# Patient Record
Sex: MALE | Race: WHITE | NOT HISPANIC OR LATINO | Employment: OTHER | URBAN - METROPOLITAN AREA
[De-identification: names, ages, dates, MRNs, and addresses within clinical notes are randomized per-mention and may not be internally consistent; named-entity substitution may affect disease eponyms.]

---

## 2017-03-29 ENCOUNTER — TELEPHONE (OUTPATIENT)
Dept: NEUROSURGERY | Facility: CLINIC | Age: 82
End: 2017-03-29

## 2017-03-29 NOTE — TELEPHONE ENCOUNTER
"Pts wife called to schedule an appt for . Informed wife we will need a referral in order to schedule pt. Wife is very concerned stating that "they do not have time to wait for a referral". Reiterated to wife that i understand the concern and would like to assist in any way possible however a referral is needed. Informed wife that once referral is sent we can schedule pt to see Dr. Johnston but due to the high intake of pts Dr. Johnston would not have an open availability until 4/26/17. Wife states that this is to long of a wait and that this is an "emergency and he will die before then". Advised wife that if she feels this is a medical emergency to take pt to local ER. Wife states the ER has done nothing to help but she will call his PCP and discuss being admitted to hospital for pain control.   "

## 2017-04-07 ENCOUNTER — TELEPHONE (OUTPATIENT)
Dept: RHEUMATOLOGY | Facility: CLINIC | Age: 82
End: 2017-04-07

## 2017-04-07 NOTE — TELEPHONE ENCOUNTER
----- Message from Yane Nicholas sent at 4/5/2017  9:52 AM CDT -----  Contact: wife  States patient is in extreme pain and needs to be seen sooner than May.  Please call back at           [phone]

## 2017-04-13 ENCOUNTER — HISTORICAL (OUTPATIENT)
Dept: ADMINISTRATIVE | Facility: HOSPITAL | Age: 82
End: 2017-04-13

## 2017-04-13 LAB
ALBUMIN SERPL-MCNC: 3.8 G/DL (ref 3.1–4.7)
ALP SERPL-CCNC: 72 IU/L (ref 40–104)
ALT (SGPT): 26 IU/L (ref 3–33)
AST SERPL-CCNC: 23 IU/L (ref 10–40)
BASOPHILS NFR BLD: 0.1 K/UL (ref 0–0.2)
BASOPHILS NFR BLD: 0.5 %
BILIRUB SERPL-MCNC: 0.9 MG/DL (ref 0.3–1)
BUN SERPL-MCNC: 31 MG/DL (ref 8–20)
CALCIUM SERPL-MCNC: 10.1 MG/DL (ref 7.7–10.4)
CHLORIDE: 105 MMOL/L (ref 98–110)
CK SERPL-CCNC: 82 IU/L (ref 18–170)
CO2 SERPL-SCNC: 25.1 MMOL/L (ref 22.8–31.6)
CREATININE: 1.22 MG/DL (ref 0.6–1.4)
CRP SERPL-MCNC: 0.44 MG/DL (ref 0–1.4)
EOSINOPHIL NFR BLD: 0.1 K/UL (ref 0–0.7)
EOSINOPHIL NFR BLD: 0.7 %
ERYTHROCYTE [DISTWIDTH] IN BLOOD BY AUTOMATED COUNT: 13.7 % (ref 11.7–14.9)
GLUCOSE: 105 MG/DL (ref 70–99)
GRAN #: 11.5 K/UL (ref 1.4–6.5)
GRAN%: 77.8 %
HCT VFR BLD AUTO: 36.5 % (ref 39–55)
HGB BLD-MCNC: 11.8 G/DL (ref 14–16)
IMMATURE GRANS (ABS): 0.3 K/UL (ref 0–1)
IMMATURE GRANULOCYTES: 1.8 %
LYMPH #: 1.6 K/UL (ref 1.2–3.4)
LYMPH%: 11.1 %
MCH RBC QN AUTO: 30.9 PG (ref 25–35)
MCHC RBC AUTO-ENTMCNC: 32.3 G/DL (ref 31–36)
MCV RBC AUTO: 95.5 FL (ref 80–100)
MONO #: 1.2 K/UL (ref 0.1–0.6)
MONO%: 8.1 %
NUCLEATED RBCS: 0 %
PLATELET # BLD AUTO: 188 K/UL (ref 140–440)
PMV BLD AUTO: 10.9 FL (ref 8.8–12.7)
POTASSIUM SERPL-SCNC: 4.8 MMOL/L (ref 3.5–5)
PROT SERPL-MCNC: 6.8 G/DL (ref 6–8.2)
RBC # BLD AUTO: 3.82 M/UL (ref 4.3–5.9)
SODIUM: 140 MMOL/L (ref 134–144)
T4 FREE SP9 P CHAL SERPL-SCNC: 1.9 NG/DL (ref 0.45–1.27)
TSH SERPL DL<=0.005 MIU/L-ACNC: 0.02 ULU/ML (ref 0.3–5.6)
URATE SERPL-MCNC: 3.8 MG/DL (ref 2.6–7.8)
WBC # BLD AUTO: 14.8 K/UL (ref 5–10)

## 2017-04-14 LAB
ALBUMIN SERPL-MCNC: 3.4 G/DL (ref 2.9–4.4)
ALBUMIN/GLOB SERPL ELPH: 1.1 {RATIO} (ref 0.7–1.7)
ALPHA 1 GLOBULIN/PROTEIN TOTAL: 0.3 G/DL (ref 0–0.4)
ALPHA 2 GLOBULIN/PROTEIN TOTAL: 0.8 G/DL (ref 0.4–1)
B-GLOBULIN FLD ELPH-MCNC: 0.9 G/DL (ref 0.7–1.3)
CCP ANTIBODIES IGG/IGA: 7 UNITS (ref 0–19)
GAMMA GLOB FLD ELPH-MCNC: 1 G/DL (ref 0.4–1.8)
GLOBULIN SER CALC-MCNC: 3 G/DL (ref 2.2–3.9)
Lab: ABNORMAL
M PROTEIN MFR UR ELPH: 0.7 G/DL
PROT SERPL-MCNC: 6.4 G/DL (ref 6–8.5)
RHEUMATOID FACT SERPL-ACNC: 8.8 IU/ML (ref 0–13.9)

## 2017-05-27 RX ORDER — CELECOXIB 200 MG/1
CAPSULE ORAL
Qty: 180 CAPSULE | Refills: 1 | Status: SHIPPED | OUTPATIENT
Start: 2017-05-27 | End: 2017-06-14 | Stop reason: SDUPTHER

## 2017-05-30 ENCOUNTER — OFFICE VISIT (OUTPATIENT)
Dept: RHEUMATOLOGY | Facility: CLINIC | Age: 82
End: 2017-05-30
Payer: MEDICARE

## 2017-05-30 VITALS
HEIGHT: 71 IN | SYSTOLIC BLOOD PRESSURE: 120 MMHG | DIASTOLIC BLOOD PRESSURE: 72 MMHG | WEIGHT: 211.81 LBS | BODY MASS INDEX: 29.65 KG/M2

## 2017-05-30 DIAGNOSIS — M25.60 MORNING STIFFNESS OF JOINTS: ICD-10-CM

## 2017-05-30 DIAGNOSIS — M19.90 CHRONIC INFLAMMATORY ARTHRITIS: Primary | Chronic | ICD-10-CM

## 2017-05-30 DIAGNOSIS — M19.90 CHRONIC INFLAMMATORY ARTHRITIS: ICD-10-CM

## 2017-05-30 DIAGNOSIS — Z79.631 METHOTREXATE, LONG TERM, CURRENT USE: ICD-10-CM

## 2017-05-30 LAB
ALBUMIN SERPL-MCNC: 4.1 G/DL (ref 3.1–4.7)
ALP SERPL-CCNC: 70 IU/L (ref 40–104)
ALT (SGPT): 26 IU/L (ref 3–33)
AST SERPL-CCNC: 26 IU/L (ref 10–40)
BASOPHILS NFR BLD: 0.1 K/UL (ref 0–0.2)
BASOPHILS NFR BLD: 0.7 %
BILIRUB SERPL-MCNC: 0.7 MG/DL (ref 0.3–1)
BUN SERPL-MCNC: 34 MG/DL (ref 8–20)
CALCIUM SERPL-MCNC: 10.2 MG/DL (ref 7.7–10.4)
CHLORIDE: 106 MMOL/L (ref 98–110)
CO2 SERPL-SCNC: 25.4 MMOL/L (ref 22.8–31.6)
CREATININE: 1.45 MG/DL (ref 0.6–1.4)
CRP SERPL-MCNC: 0.06 MG/DL (ref 0–1.4)
EOSINOPHIL NFR BLD: 0.1 K/UL (ref 0–0.7)
EOSINOPHIL NFR BLD: 1.4 %
ERYTHROCYTE [DISTWIDTH] IN BLOOD BY AUTOMATED COUNT: 16.9 % (ref 11.7–14.9)
GLUCOSE: 91 MG/DL (ref 70–99)
GRAN #: 5.9 K/UL (ref 1.4–6.5)
GRAN%: 66.5 %
HCT VFR BLD AUTO: 43 % (ref 39–55)
HGB BLD-MCNC: 14.1 G/DL (ref 14–16)
IMMATURE GRANS (ABS): 0.2 K/UL (ref 0–1)
IMMATURE GRANULOCYTES: 2.3 %
LYMPH #: 1.7 K/UL (ref 1.2–3.4)
LYMPH%: 19.6 %
MCH RBC QN AUTO: 32.7 PG (ref 25–35)
MCHC RBC AUTO-ENTMCNC: 32.8 G/DL (ref 31–36)
MCV RBC AUTO: 99.8 FL (ref 80–100)
MONO #: 0.8 K/UL (ref 0.1–0.6)
MONO%: 9.5 %
NUCLEATED RBCS: 0 %
PLATELET # BLD AUTO: 168 K/UL (ref 140–440)
PMV BLD AUTO: 10.9 FL (ref 8.8–12.7)
POTASSIUM SERPL-SCNC: 5.1 MMOL/L (ref 3.5–5)
PROT SERPL-MCNC: 7 G/DL (ref 6–8.2)
RBC # BLD AUTO: 4.31 M/UL (ref 4.3–5.9)
SODIUM: 139 MMOL/L (ref 134–144)
WBC # BLD AUTO: 8.9 K/UL (ref 5–10)

## 2017-05-30 PROCEDURE — 99213 OFFICE O/P EST LOW 20 MIN: CPT | Mod: ,,, | Performed by: INTERNAL MEDICINE

## 2017-05-30 RX ORDER — HYDROXYCHLOROQUINE SULFATE 200 MG/1
200 TABLET, FILM COATED ORAL 2 TIMES DAILY
Qty: 180 TABLET | Refills: 3 | Status: SHIPPED | OUTPATIENT
Start: 2017-05-30 | End: 2017-06-26 | Stop reason: SDUPTHER

## 2017-06-01 ENCOUNTER — TELEPHONE (OUTPATIENT)
Dept: RHEUMATOLOGY | Facility: CLINIC | Age: 82
End: 2017-06-01

## 2017-06-01 DIAGNOSIS — R94.4 ABNORMAL RESULTS OF KIDNEY FUNCTION STUDIES: Primary | ICD-10-CM

## 2017-06-01 NOTE — TELEPHONE ENCOUNTER
Pt notified of results. Pt states he has been taking 2 celebrex qd. Pt instructed to decrease to 1 daily and repeat bmp in one month pt verbalizes understanding of all. Lab order ready. leander

## 2017-06-14 DIAGNOSIS — M19.90 OSTEOARTHRITIS, UNSPECIFIED OSTEOARTHRITIS TYPE, UNSPECIFIED SITE: Primary | ICD-10-CM

## 2017-06-15 RX ORDER — CELECOXIB 200 MG/1
200 CAPSULE ORAL DAILY
Qty: 90 CAPSULE | Refills: 1 | Status: SHIPPED | OUTPATIENT
Start: 2017-06-15 | End: 2017-11-28 | Stop reason: SDUPTHER

## 2017-06-20 DIAGNOSIS — M19.90 CHRONIC INFLAMMATORY ARTHRITIS: Chronic | ICD-10-CM

## 2017-06-20 DIAGNOSIS — M19.90 CHRONIC INFLAMMATORY ARTHRITIS: ICD-10-CM

## 2017-06-20 DIAGNOSIS — M35.3 PMR (POLYMYALGIA RHEUMATICA): Primary | ICD-10-CM

## 2017-06-20 RX ORDER — PREDNISONE 2.5 MG/1
2.5 TABLET ORAL 2 TIMES DAILY
Qty: 180 TABLET | Refills: 1 | OUTPATIENT
Start: 2017-06-20

## 2017-06-20 RX ORDER — METHOTREXATE 2.5 MG/1
TABLET ORAL
Refills: 1 | COMMUNITY
Start: 2017-05-24 | End: 2019-06-20

## 2017-06-20 RX ORDER — FOLIC ACID 1 MG/1
TABLET ORAL
Refills: 1 | OUTPATIENT
Start: 2017-06-20

## 2017-06-20 RX ORDER — PREDNISONE 2.5 MG/1
2.5 TABLET ORAL 2 TIMES DAILY
Refills: 1 | COMMUNITY
Start: 2017-04-29 | End: 2017-06-26 | Stop reason: SDUPTHER

## 2017-06-20 RX ORDER — METHOTREXATE 2.5 MG/1
TABLET ORAL
Refills: 1 | OUTPATIENT
Start: 2017-06-20

## 2017-06-20 RX ORDER — FOLIC ACID 1 MG/1
TABLET ORAL
Refills: 1 | COMMUNITY
Start: 2017-05-24 | End: 2017-06-26 | Stop reason: SDUPTHER

## 2017-06-20 NOTE — TELEPHONE ENCOUNTER
My note states that I had placed him not on methotrexate but on hydroxychloroquine. Please check with the patient to determine which medication he is actually taking from me. Let me know. Thank you

## 2017-06-20 NOTE — TELEPHONE ENCOUNTER
Please look into the note above on whether Mr. Logan Galeano is on Plaquenil or methotrexate. Thanks

## 2017-06-26 RX ORDER — HYDROXYCHLOROQUINE SULFATE 200 MG/1
200 TABLET, FILM COATED ORAL 2 TIMES DAILY
Qty: 180 TABLET | Refills: 3 | Status: SHIPPED | OUTPATIENT
Start: 2017-06-26 | End: 2018-06-26

## 2017-06-26 RX ORDER — PREDNISONE 2.5 MG/1
2.5 TABLET ORAL 2 TIMES DAILY
Qty: 180 TABLET | Refills: 1 | Status: SHIPPED | OUTPATIENT
Start: 2017-06-26 | End: 2017-12-05 | Stop reason: SDUPTHER

## 2017-06-26 RX ORDER — FOLIC ACID 1 MG/1
TABLET ORAL
Qty: 90 TABLET | Refills: 3 | Status: SHIPPED | OUTPATIENT
Start: 2017-06-26 | End: 2018-06-03 | Stop reason: SDUPTHER

## 2017-06-29 LAB
ALBUMIN SERPL-MCNC: 4.6 G/DL (ref 3.1–4.7)
ALP SERPL-CCNC: 60 IU/L (ref 40–104)
ALT (SGPT): 24 IU/L (ref 3–33)
AST SERPL-CCNC: 28 IU/L (ref 10–40)
BILIRUB SERPL-MCNC: 0.8 MG/DL (ref 0.3–1)
BUN SERPL-MCNC: 30 MG/DL (ref 8–20)
CALCIUM SERPL-MCNC: 10.4 MG/DL (ref 7.7–10.4)
CHLORIDE: 105 MMOL/L (ref 98–110)
CO2 SERPL-SCNC: 24.7 MMOL/L (ref 22.8–31.6)
CREATININE: 1.34 MG/DL (ref 0.6–1.4)
GLUCOSE: 119 MG/DL (ref 70–99)
HBA1C MFR BLD: 5.9 % (ref 3.1–6.5)
POTASSIUM SERPL-SCNC: 4.5 MMOL/L (ref 3.5–5)
PROT SERPL-MCNC: 7.1 G/DL (ref 6–8.2)
SODIUM: 137 MMOL/L (ref 134–144)
TSH SERPL DL<=0.005 MIU/L-ACNC: 0.71 ULU/ML (ref 0.3–5.6)
URATE SERPL-MCNC: 5.4 MG/DL (ref 2.6–7.8)

## 2017-09-18 ENCOUNTER — OFFICE VISIT (OUTPATIENT)
Dept: RHEUMATOLOGY | Facility: CLINIC | Age: 82
End: 2017-09-18
Payer: MEDICARE

## 2017-09-18 VITALS
WEIGHT: 212.69 LBS | SYSTOLIC BLOOD PRESSURE: 158 MMHG | BODY MASS INDEX: 29.77 KG/M2 | DIASTOLIC BLOOD PRESSURE: 90 MMHG | HEIGHT: 71 IN

## 2017-09-18 DIAGNOSIS — I10 ESSENTIAL HYPERTENSION: ICD-10-CM

## 2017-09-18 DIAGNOSIS — Z79.899 LONG-TERM USE OF HIGH-RISK MEDICATION: Primary | ICD-10-CM

## 2017-09-18 DIAGNOSIS — Z79.899 ENCOUNTER FOR LONG-TERM (CURRENT) USE OF OTHER MEDICATIONS: ICD-10-CM

## 2017-09-18 DIAGNOSIS — M19.90 CHRONIC INFLAMMATORY ARTHRITIS: Primary | ICD-10-CM

## 2017-09-18 LAB
ALBUMIN SERPL-MCNC: 4.3 G/DL (ref 3.1–4.7)
ALP SERPL-CCNC: 68 IU/L (ref 40–104)
ALT (SGPT): 27 IU/L (ref 3–33)
AST SERPL-CCNC: 29 IU/L (ref 10–40)
BASOPHILS NFR BLD: 0.1 K/UL (ref 0–0.2)
BASOPHILS NFR BLD: 0.5 %
BILIRUB SERPL-MCNC: 0.9 MG/DL (ref 0.3–1)
BUN SERPL-MCNC: 33 MG/DL (ref 8–20)
CALCIUM SERPL-MCNC: 10.3 MG/DL (ref 7.7–10.4)
CHLORIDE: 103 MMOL/L (ref 98–110)
CO2 SERPL-SCNC: 25.4 MMOL/L (ref 22.8–31.6)
CREATININE: 1.59 MG/DL (ref 0.6–1.4)
CRP SERPL-MCNC: 0.07 MG/DL (ref 0–1.4)
EOSINOPHIL NFR BLD: 0.2 K/UL (ref 0–0.7)
EOSINOPHIL NFR BLD: 2 %
ERYTHROCYTE [DISTWIDTH] IN BLOOD BY AUTOMATED COUNT: 13.3 % (ref 11.7–14.9)
GLUCOSE: 173 MG/DL (ref 70–99)
GRAN #: 6.7 K/UL (ref 1.4–6.5)
GRAN%: 70.2 %
HBA1C MFR BLD: 6 % (ref 3.1–6.5)
HCT VFR BLD AUTO: 44.3 % (ref 39–55)
HGB BLD-MCNC: 14.5 G/DL (ref 14–16)
IMMATURE GRANS (ABS): 0.1 K/UL (ref 0–1)
IMMATURE GRANULOCYTES: 1.5 %
LYMPH #: 1.7 K/UL (ref 1.2–3.4)
LYMPH%: 17.7 %
MCH RBC QN AUTO: 32.5 PG (ref 25–35)
MCHC RBC AUTO-ENTMCNC: 32.7 G/DL (ref 31–36)
MCV RBC AUTO: 99.3 FL (ref 80–100)
MONO #: 0.8 K/UL (ref 0.1–0.6)
MONO%: 8.1 %
NUCLEATED RBCS: 0 %
PLATELET # BLD AUTO: 147 K/UL (ref 140–440)
PMV BLD AUTO: 10.9 FL (ref 8.8–12.7)
POTASSIUM SERPL-SCNC: 4.6 MMOL/L (ref 3.5–5)
PROT SERPL-MCNC: 7.1 G/DL (ref 6–8.2)
RBC # BLD AUTO: 4.46 M/UL (ref 4.3–5.9)
SODIUM: 137 MMOL/L (ref 134–144)
WBC # BLD AUTO: 9.5 K/UL (ref 5–10)

## 2017-09-18 PROCEDURE — 99213 OFFICE O/P EST LOW 20 MIN: CPT | Mod: ,,, | Performed by: INTERNAL MEDICINE

## 2017-09-18 PROCEDURE — 1126F AMNT PAIN NOTED NONE PRSNT: CPT | Mod: ,,, | Performed by: INTERNAL MEDICINE

## 2017-09-18 PROCEDURE — 1159F MED LIST DOCD IN RCRD: CPT | Mod: ,,, | Performed by: INTERNAL MEDICINE

## 2017-09-18 NOTE — PROGRESS NOTES
Patient denies feeling bad and admits to not taking his BP medication today.  Patient advised to take medication as soon as he is able.          [facility] RHEUMATOLOGY           Follow-up visit      Subjective:       Patient ID:   NAME: Logan Galeano : 1935     82 y.o. male    Referring Doc: No ref. provider found  Other Physicians:    Chief Complaint:  Rheumatoid Arthritis      HPI/Interval History:      The patient is doing extremely well with regard to his rheumatoid arthritis. He has absolutely no joint swelling, redness, or warmth. He has no morning stiffness. He has been able to perform all of his chores and ADLs.          ROS:   GEN: no fever, night sweats or weight loss  SKIN:  no rashes , erythema, bruising, or swelling, no Raynauds, no photosensitivity  HEENT: no HAs, no changes in vision, no mouth ulcers, no sicca symptoms, no scalp tenderness, jaw claudication.  CV: no CP, SOB, PND, YAN or orthopnea,no palpitations  PULM: no SOB, cough, hemoptysis, sputum or pleuritic pain  GI: no abdominal pain, nausea, vomiting, constipation, diarrhea, melanotic stools, BRBPR, or hematemesis.no dysphagia  : no hematuria, dysuria  NEURO:no paresthesias, headaches, visual disturbances, muscle weakness  MUSCULOSKELETAL:  o complaints of joint pain or swelling  PSYCH: No insomnia, no significant depression, no anxiety    Medications:    Current Outpatient Prescriptions:     allopurinol (ZYLOPRIM) 300 MG tablet, Take 300 mg by mouth once daily., Disp: , Rfl:     aspirin (ECOTRIN) 325 MG EC tablet, Take 325 mg by mouth once daily., Disp: , Rfl:     celecoxib (CELEBREX) 200 MG capsule, Take 1 capsule (200 mg total) by mouth once daily. DOSE DECREASE!, Disp: 90 capsule, Rfl: 1    esomeprazole (NEXIUM) 40 MG capsule, Take 40 mg by mouth before breakfast., Disp: , Rfl:     ezetimibe-simvastatin 10-80 mg (VYTORIN) 10-80 mg per tablet, Take 1 tablet by mouth every evening., Disp: , Rfl:     folic acid (FOLVITE) 1 MG  "tablet, TK 1 T PO QD, Disp: 90 tablet, Rfl: 3    hydrocodone-acetaminophen 7.5-325mg (NORCO) 7.5-325 mg per tablet, Take 1 tablet by mouth every 6 (six) hours as needed., Disp: , Rfl: 0    hydroxychloroquine (PLAQUENIL) 200 mg tablet, Take 1 tablet (200 mg total) by mouth 2 (two) times daily., Disp: 180 tablet, Rfl: 3    levothyroxine (SYNTHROID) 125 MCG tablet, Take 125 mcg by mouth once daily., Disp: , Rfl:     metformin (GLUCOPHAGE) 500 MG tablet, Take 1,000 mg by mouth daily with breakfast., Disp: , Rfl:     methotrexate 2.5 MG Tab, TAKE 5 TABLETS BY MOUTH PER WEEK, Disp: , Rfl: 1    prasugrel (EFFIENT) 10 mg Tab, Take 10 mg by mouth once daily., Disp: , Rfl:     predniSONE (DELTASONE) 2.5 MG tablet, Take 1 tablet (2.5 mg total) by mouth 2 (two) times daily., Disp: 180 tablet, Rfl: 1    telmisartan (MICARDIS) 80 MG Tab, Take 80 mg by mouth once daily., Disp: , Rfl:     thyrotropin pacheco 1.1 mg SolR, Inject 0.9 mg into the muscle., Disp: , Rfl:   FAMILY HISTORY: negative for Connective Tissue Disease        Review of patient's allergies indicates:  No Known Allergies          Objective:     Vitals:  Blood pressure (!) 158/90, height 5' 11" (1.803 m), weight 96.5 kg (212 lb 11.2 oz).    Physical Examination:   GEN: wn/wd male in no apparent distress; AAOx3  SKIN: no rashes, no lesions, no sclerodactyly, no Raynaud's, no periungual erythema  HEAD: no alopecia, no scalp tenderness, no temporal artery tenderness or induration.  EYES: no pallor, no icterus, PERRLA  ENT:  no thrush, no mucosal dryness or ulcerations, adequate oral hygiene & dentition.  NECK: supple x 6, no masses, no thyromegaly, no lymphadenopathy.  CV:   S1 and S2 regular, no murmurs, gallop or rubs  CHEST: Normal respiratory effort;  normal breath sounds/no adventitious sounds. No signs of consolidation.  ABD: non-tender and non-distended; soft; normal bowel sounds; no rebound/guarding or tenderness. No hepatosplenomegaly.  Musculoskeletal:  " No evidence of any inflammatory disease activity  EXTREM: no clubbing, cyanosis or edema. normal pulses.  NEURO: grossly intact; motor/sensory WNL; AAOx3; no tremors  PSYCH: normal mood, affect and behavior            Labs:   Lab Results   Component Value Date    WBC 8.9 05/30/2017    HGB 14.1 05/30/2017    HCT 43.0 05/30/2017    MCV 99.8 05/30/2017     05/30/2017   CMP@  Sodium   Date Value Ref Range Status   06/29/2017 137 134 - 144 mmol/L      Potassium   Date Value Ref Range Status   06/29/2017 4.5 3.5 - 5.0 mmol/L      Chloride   Date Value Ref Range Status   06/29/2017 105 98 - 110 mmol/L      CO2   Date Value Ref Range Status   06/29/2017 24.7 22.8 - 31.6 mmol/L      Glucose   Date Value Ref Range Status   06/29/2017 119 (H) 70 - 99 mg/dL      BUN, Bld   Date Value Ref Range Status   06/29/2017 30 (H) 8 - 20 mg/dL      Creatinine   Date Value Ref Range Status   06/29/2017 1.34 0.60 - 1.40 mg/dL      Calcium   Date Value Ref Range Status   06/29/2017 10.4 7.7 - 10.4 mg/dL      Total Protein   Date Value Ref Range Status   06/29/2017 7.1 6.0 - 8.2 g/dL      Albumin   Date Value Ref Range Status   06/29/2017 4.6 3.1 - 4.7 g/dL      Total Bilirubin   Date Value Ref Range Status   06/29/2017 0.8 0.3 - 1.0 mg/dL      Alkaline Phosphatase   Date Value Ref Range Status   06/29/2017 60 40 - 104 IU/L      AST   Date Value Ref Range Status   06/29/2017 28 10 - 40 IU/L      ALT   Date Value Ref Range Status   03/01/2017 25 10 - 44 U/L Final     CPK   Date Value Ref Range Status   04/13/2017 82 18 - 170 IU/L      CRP   Date Value Ref Range Status   05/30/2017 0.06 0.00 - 1.40 mg/dL      Rheumatoid Factor   Date Value Ref Range Status   04/13/2017 8.8 0.0 - 13.9 IU/mL      Comment:     Performed at: [facility], [facility] [id number] [place] [address], [id number] Camarillo MD, Phone:  [phone]     Uric Acid   Date Value Ref Range Status   06/29/2017 5.4 2.6 - 7.8 mg/dL          Radiology/Diagnostic  Studies:    no x-rays are available    Assessment/Discussion/Plan:   82 y.o. male with rheumatoid arthritis-excellent control area        PLAN:  I will continue his medication without change. Routine blood testing was obtained.  The patient was asked to keep an eye on his blood pressure and to see his primary care sooner rather than later.      RTC:  I will see him back in 4 months.      Electronically signed by Talib Urbano MD

## 2017-10-04 ENCOUNTER — OFFICE VISIT (OUTPATIENT)
Dept: URGENT CARE | Facility: CLINIC | Age: 82
End: 2017-10-04
Payer: MEDICARE

## 2017-10-04 VITALS
SYSTOLIC BLOOD PRESSURE: 141 MMHG | TEMPERATURE: 97 F | HEART RATE: 62 BPM | BODY MASS INDEX: 29.68 KG/M2 | WEIGHT: 212 LBS | HEIGHT: 71 IN | OXYGEN SATURATION: 96 % | DIASTOLIC BLOOD PRESSURE: 91 MMHG

## 2017-10-04 DIAGNOSIS — B02.9 HERPES ZOSTER WITHOUT COMPLICATION: Primary | ICD-10-CM

## 2017-10-04 PROCEDURE — 99214 OFFICE O/P EST MOD 30 MIN: CPT | Mod: S$GLB,,, | Performed by: FAMILY MEDICINE

## 2017-10-04 RX ORDER — VALACYCLOVIR HYDROCHLORIDE 1 G/1
1000 TABLET, FILM COATED ORAL 3 TIMES DAILY
Qty: 21 TABLET | Refills: 0 | Status: SHIPPED | OUTPATIENT
Start: 2017-10-04 | End: 2017-10-04 | Stop reason: SDUPTHER

## 2017-10-04 RX ORDER — PANTOPRAZOLE SODIUM 40 MG/1
TABLET, DELAYED RELEASE ORAL
COMMUNITY
Start: 2017-07-03 | End: 2019-06-20 | Stop reason: CLARIF

## 2017-10-04 RX ORDER — VALACYCLOVIR HYDROCHLORIDE 1 G/1
1000 TABLET, FILM COATED ORAL 3 TIMES DAILY
Qty: 21 TABLET | Refills: 0 | Status: SHIPPED | OUTPATIENT
Start: 2017-10-04 | End: 2019-06-20

## 2017-10-04 NOTE — PATIENT INSTRUCTIONS
.  Shingles (Herpes Zoster)     Talk to your healthcare provider about the shingles vaccine.     Shingles is also called herpes zoster. It is a painful skin rash caused by the herpes zoster virus. This is the same virus that causes chickenpox. After a person has chickenpox, the virus remains inactive in the nerve cells. Years later, the virus can become active again and travel to the skin. Most people have shingles only once, but it is possible to have it more than once.  What are the risk factors for shingles?  Anyone who has ever had chickenpox can develop shingles. But your risk is greater if you:  · Are 50 years of age or older  · Have an illness that weakens your immune system, such as HIV/AIDS  · Have cancer, especially Hodgkin disease or lymphoma  · Take medicines that weaken your immune system  What are the symptoms of shingles?  · The first sign of shingles is usually pain, burning, tingling, or itching on one part of your face or body. You may also feel as if you have the flu, with fever and chills.  · A red rash with small blisters appears within a few days. The rash may appear as follows:   ¨ The blisters can occur anywhere, but theyre most common on the back, chest, or abdomen.  ¨ They usually appear on only one side of the body, spreading along the nerve pathway where the virus was inactive.   ¨ The rash can also form around an eye, along one side of the face or neck, or in the mouth.  ¨ In a few people, usually those with weakened immune systems, shingles appear on more than one part of the body at once.  · After a few days, the blisters become dry and form a crust. The crust falls off in days to weeks. The blisters generally do not leave scars.  How is shingles treated?  For most people, shingles heals on its own in a few weeks. But treatment is recommended to help relieve pain, speed healing, and reduce the risk of complications. Antiviral medicines are prescribed within the first 72 hours of the  appearance of the rash. To lessen symptoms:  · Apply ice packs (wrapped in a thin towel) or cool compresses, or soak in a cool bath.  · Use calamine lotion to calm itchy skin.  · Ask your healthcare provider about over-the-counter pain relievers. If your pain is severe, your healthcare provider may prescribe stronger pain medicines.  What are the complications of shingles?  Shingles often goes away with no lasting effects. But some people have serious problems long after the blisters have healed:  · Postherpetic neuralgia. This is the most common complication. It is severe nerve pain at the place where the rash used to be. It can last for months, or even years after you have had shingles. Medicines can be prescribed to help relieve the pain and improve quality of life.  · Bacterial infection. Shingles blisters may become infected with bacteria. Antibiotic medicine is used to treat the infection.  · Eye problems. A person with shingles on the face should see his or her healthcare provider right away. Shingles can cause serious problems with vision, and even blindness.  Very rarely shingles can also lead to pneumonia, hearing problems, brain inflammation, or even death.   When to seek medical care  Contact your healthcare provider if you experience any of the following:  · Symptoms that dont go away with treatment  · A rash or blisters near your eye  · Increased drainage, fever, or rash after treatment, or severe pain that doesnt go away   How can shingles be prevented?  You can only get shingles if you have had chicken pox in the past. Those who have never had chickenpox can get the virus from you. Although instead of developing shingles, the person may get chickenpox. Until your blisters form scabs, avoid contact with others, especially the following:  · Pregnant women who have never had chickenpox or the vaccine  · Infants who were born early (prematurely) or who had low weight at birth  · People with weak immune  system (for example, people receiving chemotherapy for cancer, people who have had organ transplants, or people with HIV infections)     The shingles vaccine  If youre 60 years of age or older, ask your healthcare provider if you should receive the shingles vaccine. The vaccine makes it less likely that you will develop shingles. If you do develop shingles, your symptoms will likely be milder than if you hadnt been vaccinated. Note: Although the vaccine is licensed for people 50 years of age or older, the CDC does not recommend the vaccine for those who are 50 to 59 years old.   Date Last Reviewed: 10/1/2016  © 0518-8343 [employer]. [address] [place] [address], [place] [address]. All rights reserved. This information is not intended as a substitute for professional medical care. Always follow your healthcare professional's instructions.

## 2017-10-04 NOTE — PROGRESS NOTES
"Subjective:       Patient ID: Logan Galeano is a 82 y.o. male.    Vitals:  height is 5' 11" (1.803 m) and weight is 96.2 kg (212 lb). His oral temperature is 97.4 °F (36.3 °C). His blood pressure is 141/91 (abnormal) and his pulse is 62. His oxygen saturation is 96%.     Chief Complaint: Abscess    Abscess   Chronicity:  NewProgression Since Onset: worsening  Location:  Torso  Associated Symptoms: no fever, no chills  Characteristics: itching, painful and redness    Pain Scale:  6/10  Treatments Tried:  Nothing  Relieved by:  Nothing  Worsened by:  Nothing    Review of Systems   Constitution: Negative for chills and fever.   HENT: Negative for sore throat.    Respiratory: Negative for shortness of breath.    Skin: Positive for itching. Negative for rash.   Musculoskeletal: Negative for joint pain.       Objective:      Physical Exam   Constitutional: He is oriented to person, place, and time. He appears well-developed and well-nourished. He is cooperative.  Non-toxic appearance. He does not appear ill. No distress.   HENT:   Head: Normocephalic and atraumatic.   Right Ear: Hearing, tympanic membrane and ear canal normal.   Left Ear: Hearing, tympanic membrane and ear canal normal.   Nose: Nose normal. No mucosal edema, rhinorrhea or nasal deformity. No epistaxis. Right sinus exhibits no maxillary sinus tenderness and no frontal sinus tenderness. Left sinus exhibits no maxillary sinus tenderness and no frontal sinus tenderness.   Mouth/Throat: Uvula is midline and mucous membranes are normal. No trismus in the jaw. Normal dentition. No uvula swelling. No posterior oropharyngeal erythema.   Eyes: Conjunctivae and lids are normal. Right eye exhibits no discharge. Left eye exhibits no discharge. No scleral icterus.   Sclera clear bilat   Neck: Trachea normal, normal range of motion, full passive range of motion without pain and phonation normal. Neck supple.   Cardiovascular: Normal rate, regular rhythm and normal " pulses.    Pulmonary/Chest: Effort normal. No respiratory distress.   Abdominal: Normal appearance. He exhibits no distension and no pulsatile midline mass.   Musculoskeletal: Normal range of motion. He exhibits no edema or deformity.   Neurological: He is alert and oriented to person, place, and time. He exhibits normal muscle tone. Coordination normal.   Skin: Skin is warm, dry and intact. Rash noted. Rash is vesicular. He is not diaphoretic. No pallor.        Psychiatric: He has a normal mood and affect. His speech is normal and behavior is normal. Judgment and thought content normal. Cognition and memory are normal.   Nursing note and vitals reviewed.      Assessment:       1. Herpes zoster without complication        Plan:         Herpes zoster without complication

## 2017-11-01 LAB
BUN SERPL-MCNC: 33 MG/DL (ref 8–20)
CALCIUM SERPL-MCNC: 10.2 MG/DL (ref 7.7–10.4)
CHLORIDE: 108 MMOL/L (ref 98–110)
CO2 SERPL-SCNC: 25.6 MMOL/L (ref 22.8–31.6)
CREATININE: 1.46 MG/DL (ref 0.6–1.4)
GLUCOSE: 92 MG/DL (ref 70–99)
POTASSIUM SERPL-SCNC: 4.4 MMOL/L (ref 3.5–5)
SODIUM: 142 MMOL/L (ref 134–144)

## 2017-11-28 DIAGNOSIS — M19.90 OSTEOARTHRITIS, UNSPECIFIED OSTEOARTHRITIS TYPE, UNSPECIFIED SITE: ICD-10-CM

## 2017-11-28 RX ORDER — CELECOXIB 200 MG/1
CAPSULE ORAL
Qty: 90 CAPSULE | Refills: 1 | Status: SHIPPED | OUTPATIENT
Start: 2017-11-28 | End: 2018-05-27

## 2017-12-05 DIAGNOSIS — M19.90 CHRONIC INFLAMMATORY ARTHRITIS: ICD-10-CM

## 2017-12-05 RX ORDER — PREDNISONE 2.5 MG/1
TABLET ORAL
Qty: 180 TABLET | Refills: 1 | Status: SHIPPED | OUTPATIENT
Start: 2017-12-05 | End: 2018-06-03

## 2018-01-24 ENCOUNTER — OFFICE VISIT (OUTPATIENT)
Dept: RHEUMATOLOGY | Facility: CLINIC | Age: 83
End: 2018-01-24
Payer: MEDICARE

## 2018-01-24 VITALS — WEIGHT: 212.19 LBS | DIASTOLIC BLOOD PRESSURE: 64 MMHG | SYSTOLIC BLOOD PRESSURE: 94 MMHG | BODY MASS INDEX: 29.6 KG/M2

## 2018-01-24 DIAGNOSIS — M10.9 GOUT, UNSPECIFIED CAUSE, UNSPECIFIED CHRONICITY, UNSPECIFIED SITE: ICD-10-CM

## 2018-01-24 DIAGNOSIS — M05.79 RHEUMATOID ARTHRITIS INVOLVING MULTIPLE SITES WITH POSITIVE RHEUMATOID FACTOR: Primary | ICD-10-CM

## 2018-01-24 DIAGNOSIS — Z79.899 ENCOUNTER FOR LONG-TERM (CURRENT) DRUG USE: ICD-10-CM

## 2018-01-24 PROCEDURE — 99212 OFFICE O/P EST SF 10 MIN: CPT | Mod: ,,, | Performed by: INTERNAL MEDICINE

## 2018-01-24 NOTE — PROGRESS NOTES
[facility] RHEUMATOLOGY           Follow-up visit      Subjective:       Patient ID:   NAME: Logan Galeano : 1935     82 y.o. male    Referring Doc: No ref. provider found  Other Physicians:    Chief Complaint:  Rheumatoid Arthritis (right foot- feels like plantar fascitis in coming back)      HPI/Interval History:    Patient is doing well. He has had no problems with red, hot, and/or swollen joints. He does feel that is plantar fasciitis is beginning to recur. He has an appointment to see orthopedics for that.          ROS:   GEN:    no fever, night sweats or weight loss  SKIN:   no rashes , erythema, bruising, or swelling, no Raynauds, no photosensitivity  HEENT: no HAs, no changes in vision, no mouth ulcers, no sicca symptoms, no scalp tenderness, jaw claudication.  CV:      no CP, SOB, PND, YAN or orthopnea,no palpitations  PULM: no SOB, cough, hemoptysis, sputum or pleuritic pain  GI:      no abdominal pain, nausea, vomiting, constipation, diarrhea, melanotic stools, BRBPR, or hematemesis, no dysphagia, no GERD  :     no hematuria, dysuria  NEURO: no paresthesias, headaches, visual disturbances  MUSCULOSKELETAL:  no muscle or joint pain  PSYCH:   No insomnia, no significant depression, no anxiety    Medications:    Current Outpatient Prescriptions:     allopurinol (ZYLOPRIM) 300 MG tablet, Take 300 mg by mouth once daily., Disp: , Rfl:     aspirin (ECOTRIN) 325 MG EC tablet, Take 325 mg by mouth once daily., Disp: , Rfl:     celecoxib (CELEBREX) 200 MG capsule, TAKE 1 CAPSULE DAILY (DOSE DECREASE), Disp: 90 capsule, Rfl: 1    esomeprazole (NEXIUM) 40 MG capsule, Take 40 mg by mouth before breakfast., Disp: , Rfl:     ezetimibe-simvastatin 10-80 mg (VYTORIN) 10-80 mg per tablet, Take 1 tablet by mouth every evening., Disp: , Rfl:     folic acid (FOLVITE) 1 MG tablet, TK 1 T PO QD, Disp: 90 tablet, Rfl: 3    hydrocodone-acetaminophen 7.5-325mg (NORCO) 7.5-325 mg per tablet, Take 1 tablet by mouth  every 6 (six) hours as needed., Disp: , Rfl: 0    hydroxychloroquine (PLAQUENIL) 200 mg tablet, Take 1 tablet (200 mg total) by mouth 2 (two) times daily., Disp: 180 tablet, Rfl: 3    levothyroxine (SYNTHROID) 125 MCG tablet, Take 125 mcg by mouth once daily., Disp: , Rfl:     metformin (GLUCOPHAGE) 500 MG tablet, Take 1,000 mg by mouth daily with breakfast., Disp: , Rfl:     methotrexate 2.5 MG Tab, TAKE 5 TABLETS BY MOUTH PER WEEK, Disp: , Rfl: 1    pantoprazole (PROTONIX) 40 MG tablet, , Disp: , Rfl:     prasugrel (EFFIENT) 10 mg Tab, Take 10 mg by mouth once daily., Disp: , Rfl:     predniSONE (DELTASONE) 2.5 MG tablet, TAKE 1 TABLET TWICE A DAY, Disp: 180 tablet, Rfl: 1    telmisartan (MICARDIS) 80 MG Tab, Take 80 mg by mouth once daily., Disp: , Rfl:     thyrotropin pacheco 1.1 mg SolR, Inject 0.9 mg into the muscle., Disp: , Rfl:     valacyclovir (VALTREX) 1000 MG tablet, Take 1 tablet (1,000 mg total) by mouth 3 (three) times daily., Disp: 21 tablet, Rfl: 0      FAMILY HISTORY: negative for Connective Tissue Disease        Review of patient's allergies indicates:  No Known Allergies          Objective:     Vitals:  Blood pressure 94/64, weight 96.3 kg (212 lb 3.2 oz).    Physical Examination:   GEN: wn/wd male in no apparent distress  SKIN: no rashes, no lesions, no sclerodactyly, no Raynaud's, no periungual erythema  HEAD: no alopecia, no scalp tenderness, no temporal artery tenderness or induration.  EYES: no pallor, no icterus, PERRLA  ENT:  no thrush, no mucosal dryness or ulcerations, adequate oral hygiene & dentition.  NECK: supple x 6, no masses, no thyromegaly, no lymphadenopathy.  CV:   S1 and S2 regular, no murmurs, gallop or rubs  CHEST: Normal respiratory effort;  normal breath sounds/no adventitious sounds. No signs of consolidation.  ABD: non-tender and non-distended; soft; normal bowel sounds; no rebound/guarding or tenderness. No hepatosplenomegaly.  Musculoskeletal:  No evidence of  active inflammatory arthritis. No evidence of progressive deformity  EXTREM: no clubbing, cyanosis or edema. normal pulses.  NEURO: grossly intact; motor/sensory WNL; AAOx3; no tremors  PSYCH:  normal mood, affect and behavior            Labs:   Lab Results   Component Value Date    WBC 9.5 09/18/2017    HGB 14.5 09/18/2017    HCT 44.3 09/18/2017    MCV 99.3 09/18/2017     09/18/2017   CMP@  Sodium   Date Value Ref Range Status   11/01/2017 142 134 - 144 mmol/L      Potassium   Date Value Ref Range Status   11/01/2017 4.4 3.5 - 5.0 mmol/L      Chloride   Date Value Ref Range Status   11/01/2017 108 98 - 110 mmol/L      CO2   Date Value Ref Range Status   11/01/2017 25.6 22.8 - 31.6 mmol/L      Glucose   Date Value Ref Range Status   11/01/2017 92 70 - 99 mg/dL      BUN, Bld   Date Value Ref Range Status   11/01/2017 33 (H) 8 - 20 mg/dL      Creatinine   Date Value Ref Range Status   11/01/2017 1.46 (H) 0.60 - 1.40 mg/dL      Calcium   Date Value Ref Range Status   11/01/2017 10.2 7.7 - 10.4 mg/dL      Total Protein   Date Value Ref Range Status   09/18/2017 7.1 6.0 - 8.2 g/dL      Albumin   Date Value Ref Range Status   09/18/2017 4.3 3.1 - 4.7 g/dL      Total Bilirubin   Date Value Ref Range Status   09/18/2017 0.9 0.3 - 1.0 mg/dL      Alkaline Phosphatase   Date Value Ref Range Status   09/18/2017 68 40 - 104 IU/L      AST   Date Value Ref Range Status   09/18/2017 29 10 - 40 IU/L      ALT   Date Value Ref Range Status   03/01/2017 25 10 - 44 U/L Final     CPK   Date Value Ref Range Status   04/13/2017 82 18 - 170 IU/L      CRP   Date Value Ref Range Status   09/18/2017 0.07 0.00 - 1.40 mg/dL      Rheumatoid Factor   Date Value Ref Range Status   04/13/2017 8.8 0.0 - 13.9 IU/mL      Comment:     Performed at: [facility], [facility] [id number] [place] [address], [id number] Camarillo MD, Phone:  [phone]     Uric Acid   Date Value Ref Range Status   06/29/2017 5.4 2.6 - 7.8 mg/dL           Radiology/Diagnostic Studies:    none    Assessment/Discussion/Plan:   82 y.o. male with seronegative rheumatoid arthritis-good response to therapy with Plaquenil, methotrexate and low-dose prednisone      PLAN:   I will continue his medication without change. Routine blood testing was ordered and will be performed at an outpatient facility.      RTC:  I will see the patient back in 4 months.      Electronically signed by Talib Urbano MD

## 2018-02-02 LAB
ALBUMIN SERPL-MCNC: 4.1 G/DL (ref 3.6–5.1)
ALBUMIN/GLOB SERPL: 1.5 (CALC) (ref 1–2.5)
ALP SERPL-CCNC: 81 U/L (ref 40–115)
ALT SERPL-CCNC: 30 U/L (ref 9–46)
AST SERPL-CCNC: 31 U/L (ref 10–35)
BASOPHILS # BLD AUTO: 63 CELLS/UL (ref 0–200)
BASOPHILS NFR BLD AUTO: 0.8 %
BILIRUB SERPL-MCNC: 0.5 MG/DL (ref 0.2–1.2)
BUN SERPL-MCNC: 28 MG/DL (ref 7–25)
BUN/CREAT SERPL: 18 (CALC) (ref 6–22)
CALCIUM SERPL-MCNC: 10.3 MG/DL (ref 8.6–10.3)
CHLORIDE SERPL-SCNC: 107 MMOL/L (ref 98–110)
CO2 SERPL-SCNC: 28 MMOL/L (ref 20–31)
CREAT SERPL-MCNC: 1.56 MG/DL (ref 0.7–1.11)
CRP SERPL-MCNC: 3.9 MG/L
EOSINOPHIL # BLD AUTO: 158 CELLS/UL (ref 15–500)
EOSINOPHIL NFR BLD AUTO: 2 %
ERYTHROCYTE [DISTWIDTH] IN BLOOD BY AUTOMATED COUNT: 12.2 % (ref 11–15)
ERYTHROCYTE [SEDIMENTATION RATE] IN BLOOD BY WESTERGREN METHOD: 11 MM/H
GFR SERPL CREATININE-BSD FRML MDRD: 41 ML/MIN/1.73M2
GLOBULIN SER CALC-MCNC: 2.7 G/DL (CALC) (ref 1.9–3.7)
GLUCOSE SERPL-MCNC: 93 MG/DL (ref 65–99)
HCT VFR BLD AUTO: 42.6 % (ref 38.5–50)
HGB BLD-MCNC: 14.4 G/DL (ref 13.2–17.1)
LYMPHOCYTES # BLD AUTO: 1288 CELLS/UL (ref 850–3900)
LYMPHOCYTES NFR BLD AUTO: 16.3 %
MCH RBC QN AUTO: 32.1 PG (ref 27–33)
MCHC RBC AUTO-ENTMCNC: 33.8 G/DL (ref 32–36)
MCV RBC AUTO: 95.1 FL (ref 80–100)
MONOCYTES # BLD AUTO: 727 CELLS/UL (ref 200–950)
MONOCYTES NFR BLD AUTO: 9.2 %
NEUTROPHILS # BLD AUTO: 5664 CELLS/UL (ref 1500–7800)
NEUTROPHILS NFR BLD AUTO: 71.7 %
PLATELET # BLD AUTO: 195 THOUSAND/UL (ref 140–400)
PMV BLD REES-ECKER: 11 FL (ref 7.5–12.5)
POTASSIUM SERPL-SCNC: 5.3 MMOL/L (ref 3.5–5.3)
PROT SERPL-MCNC: 6.8 G/DL (ref 6.1–8.1)
RBC # BLD AUTO: 4.48 MILLION/UL (ref 4.2–5.8)
SODIUM SERPL-SCNC: 142 MMOL/L (ref 135–146)
WBC # BLD AUTO: 7.9 THOUSAND/UL (ref 3.8–10.8)

## 2018-02-05 ENCOUNTER — TELEPHONE (OUTPATIENT)
Dept: RHEUMATOLOGY | Facility: CLINIC | Age: 83
End: 2018-02-05

## 2018-02-05 NOTE — TELEPHONE ENCOUNTER
----- Message from Talib Urbano MD sent at 2/5/2018  9:08 AM CST -----  Regarding: FW: Reminder about Logan Galeano [MRN: [id number]]  Please forward his most recent labs to his primary provider. Thank you  ----- Message -----  From: Talib Urbano MD  Sent: 2/2/2018  10:30 AM  To: Talib Urbano MD  Subject: Reminder about Logan Galeano [MRN: [id number]]     Send labs to pmd

## 2018-05-28 ENCOUNTER — OFFICE VISIT (OUTPATIENT)
Dept: RHEUMATOLOGY | Facility: CLINIC | Age: 83
End: 2018-05-28
Payer: MEDICARE

## 2018-05-28 VITALS — WEIGHT: 215.81 LBS | BODY MASS INDEX: 30.1 KG/M2 | SYSTOLIC BLOOD PRESSURE: 138 MMHG | DIASTOLIC BLOOD PRESSURE: 88 MMHG

## 2018-05-28 DIAGNOSIS — M05.79 RHEUMATOID ARTHRITIS INVOLVING MULTIPLE SITES WITH POSITIVE RHEUMATOID FACTOR: Primary | ICD-10-CM

## 2018-05-28 DIAGNOSIS — Z79.899 LONG-TERM USE OF HIGH-RISK MEDICATION: ICD-10-CM

## 2018-05-28 DIAGNOSIS — N18.9 CHRONIC KIDNEY DISEASE, UNSPECIFIED CKD STAGE: ICD-10-CM

## 2018-05-28 DIAGNOSIS — M10.9 GOUT, UNSPECIFIED CAUSE, UNSPECIFIED CHRONICITY, UNSPECIFIED SITE: ICD-10-CM

## 2018-05-28 LAB
ALBUMIN SERPL-MCNC: 4 G/DL (ref 3.1–4.7)
ALP SERPL-CCNC: 82 IU/L (ref 40–104)
ALT (SGPT): 27 IU/L (ref 3–33)
AST SERPL-CCNC: 29 IU/L (ref 10–40)
BASOPHILS NFR BLD: 0.1 K/UL (ref 0–0.2)
BASOPHILS NFR BLD: 1 %
BILIRUB SERPL-MCNC: 0.7 MG/DL (ref 0.3–1)
BUN SERPL-MCNC: 33 MG/DL (ref 8–20)
CALCIUM SERPL-MCNC: 9.9 MG/DL (ref 7.7–10.4)
CHLORIDE: 110 MMOL/L (ref 98–110)
CO2 SERPL-SCNC: 23.9 MMOL/L (ref 22.8–31.6)
CREATININE RANDOM URINE: 154 MG/DL
CREATININE: 1.43 MG/DL (ref 0.6–1.4)
CRP SERPL-MCNC: <0.02 MG/DL (ref 0–1.4)
EOSINOPHIL NFR BLD: 0.2 K/UL (ref 0–0.7)
EOSINOPHIL NFR BLD: 2.9 %
ERYTHROCYTE [DISTWIDTH] IN BLOOD BY AUTOMATED COUNT: 13.2 % (ref 11.7–14.9)
GLUCOSE: 147 MG/DL (ref 70–99)
GRAN #: 4.6 K/UL (ref 1.4–6.5)
GRAN%: 64.4 %
HBA1C MFR BLD: 5.9 % (ref 3.1–6.5)
HCT VFR BLD AUTO: 46.2 % (ref 39–55)
HGB BLD-MCNC: 15 G/DL (ref 14–16)
IMMATURE GRANS (ABS): 0.1 K/UL (ref 0–1)
IMMATURE GRANULOCYTES: 1.1 %
LYMPH #: 1.4 K/UL (ref 1.2–3.4)
LYMPH%: 19.7 %
MCH RBC QN AUTO: 32.3 PG (ref 25–35)
MCHC RBC AUTO-ENTMCNC: 32.5 G/DL (ref 31–36)
MCV RBC AUTO: 99.6 FL (ref 80–100)
MICROALBUM.,U,RANDOM: 32 MCG/ML (ref 0–19.9)
MICROALBUMIN/CREATININE RATIO: 21 (ref 0–30)
MONO #: 0.8 K/UL (ref 0.1–0.6)
MONO%: 10.9 %
NUCLEATED RBCS: 0 %
PLATELET # BLD AUTO: 116 K/UL (ref 140–440)
PMV BLD AUTO: 11.3 FL (ref 8.8–12.7)
POTASSIUM SERPL-SCNC: 4.4 MMOL/L (ref 3.5–5)
PROT SERPL-MCNC: 6.6 G/DL (ref 6–8.2)
RBC # BLD AUTO: 4.64 M/UL (ref 4.3–5.9)
SODIUM: 140 MMOL/L (ref 134–144)
URATE SERPL-MCNC: 3.8 MG/DL (ref 2.6–7.8)
WBC # BLD AUTO: 7.1 K/UL (ref 5–10)

## 2018-05-28 PROCEDURE — 99213 OFFICE O/P EST LOW 20 MIN: CPT | Mod: ,,, | Performed by: INTERNAL MEDICINE

## 2018-05-28 NOTE — PROGRESS NOTES
[facility] RHEUMATOLOGY           Follow-up visit    Notes dictated via Dragon to EPIC. Please forgive any unintentional errors.  Subjective:       Patient ID:   NAME: Logan Galeano : 1935     82 y.o. male    Referring Doc: No ref. provider found  Other Physicians:    Chief Complaint:  Rheumatoid Arthritis (Takes Folic Acid 1mg QD, Plaquenil 200mg BID, Prednisone 2.5mg QD)      HPI/Interval History:   The patient is doing very well. He has had no attacks of gout. He has had no episodes of red, hot, and/or swollen joints. His energy level is good and he is sleeping well.          ROS:   GEN:    no fever, night sweats or weight loss  SKIN:   no rashes , erythema, bruising, or swelling, no Raynauds, no photosensitivity  HEENT: no changes in vision, no mouth ulcers, no sicca symptoms, no scalp tenderness, no jaw claudication.  CV:      no CP, PND, YAN or orthopnea, no palpitations  PULM: no SOB, cough, hemoptysis, sputum or pleuritic pain  GI:       no abdominal pain, nausea, vomiting, constipation, diarrhea, melanotic stools, BRBPR, or hematemesis, no dysphagia, no GERD  :     no hematuria, dysuria  NEURO: no paresthesias, headaches, acute visual disturbances  MUSCULOSKELETAL:  No muscle or joint pain  PSYCH:   No insomnia, no significant anxiety or depression    Medications:    Current Outpatient Prescriptions:     allopurinol (ZYLOPRIM) 300 MG tablet, Take 300 mg by mouth once daily., Disp: , Rfl:     aspirin (ECOTRIN) 325 MG EC tablet, Take 325 mg by mouth once daily., Disp: , Rfl:     esomeprazole (NEXIUM) 40 MG capsule, Take 40 mg by mouth before breakfast., Disp: , Rfl:     ezetimibe-simvastatin 10-80 mg (VYTORIN) 10-80 mg per tablet, Take 1 tablet by mouth every evening., Disp: , Rfl:     folic acid (FOLVITE) 1 MG tablet, TK 1 T PO QD, Disp: 90 tablet, Rfl: 3    hydrocodone-acetaminophen 7.5-325mg (NORCO) 7.5-325 mg per tablet, Take 1 tablet by mouth every 6 (six) hours as needed., Disp: , Rfl:  0    hydroxychloroquine (PLAQUENIL) 200 mg tablet, Take 1 tablet (200 mg total) by mouth 2 (two) times daily., Disp: 180 tablet, Rfl: 3    levothyroxine (SYNTHROID) 125 MCG tablet, Take 125 mcg by mouth once daily., Disp: , Rfl:     methotrexate 2.5 MG Tab, TAKE 5 TABLETS BY MOUTH PER WEEK, Disp: , Rfl: 1    pantoprazole (PROTONIX) 40 MG tablet, , Disp: , Rfl:     prasugrel (EFFIENT) 10 mg Tab, Take 10 mg by mouth once daily., Disp: , Rfl:     predniSONE (DELTASONE) 2.5 MG tablet, TAKE 1 TABLET TWICE A DAY, Disp: 180 tablet, Rfl: 1    telmisartan (MICARDIS) 80 MG Tab, Take 80 mg by mouth once daily., Disp: , Rfl:     thyrotropin pacheco 1.1 mg SolR, Inject 0.9 mg into the muscle., Disp: , Rfl:     valacyclovir (VALTREX) 1000 MG tablet, Take 1 tablet (1,000 mg total) by mouth 3 (three) times daily., Disp: 21 tablet, Rfl: 0      FAMILY HISTORY: negative for Connective Tissue Disease        Review of patient's allergies indicates:  No Known Allergies          Objective:     Vitals:  Blood pressure 138/88, weight 97.9 kg (215 lb 12.8 oz).    Physical Examination:   GEN: wn/wd male in no apparent distress  SKIN: no rashes, no lesions, no sclerodactyly, no Raynaud's, no periungual erythema  HEAD: no alopecia, no scalp tenderness, no temporal artery tenderness or induration.  EYES: no pallor, no icterus, PERRLA  ENT:  no thrush, no mucosal dryness or ulcerations, adequate oral hygiene & dentition.  NECK: supple x 6, no masses, no thyromegaly, no lymphadenopathy.  CV:   S1 and S2 regular, no murmurs, gallop or rubs  CHEST: Normal respiratory effort;  normal breath sounds/no adventitious sounds. No signs of consolidation.  ABD: non-tender and non-distended; soft; normal bowel sounds; no rebound/guarding or tenderness. No hepatosplenomegaly.  Musculoskeletal:  No evidence of active inflammatory disease. No peripheral tophi. No signs of joint deformity  EXTREM: no clubbing, cyanosis or edema. normal pulses.  NEURO:  grossly intact; motor/sensory WNL; no tremors  PSYCH:  normal mood, affect and behavior            Labs:   Lab Results   Component Value Date    WBC 7.9 02/01/2018    HGB 14.4 02/01/2018    HCT 42.6 02/01/2018    MCV 95.1 02/01/2018     02/01/2018   CMP@  Sodium   Date Value Ref Range Status   02/01/2018 142 135 - 146 mmol/L Final   11/01/2017 142 134 - 144 mmol/L      Potassium   Date Value Ref Range Status   02/01/2018 5.3 3.5 - 5.3 mmol/L Final     Chloride   Date Value Ref Range Status   02/01/2018 107 98 - 110 mmol/L Final   11/01/2017 108 98 - 110 mmol/L      CO2   Date Value Ref Range Status   02/01/2018 28 20 - 31 mmol/L Final     Glucose   Date Value Ref Range Status   02/01/2018 93 65 - 99 mg/dL Final     Comment:                   Fasting reference interval        11/01/2017 92 70 - 99 mg/dL      BUN, Bld   Date Value Ref Range Status   02/01/2018 28 (H) 7 - 25 mg/dL Final     Creatinine   Date Value Ref Range Status   02/01/2018 1.56 (H) 0.70 - 1.11 mg/dL Final     Comment:     For patients >49 years of age, the reference limit  for Creatinine is approximately 13% higher for people  identified as -American.        11/01/2017 1.46 (H) 0.60 - 1.40 mg/dL      Calcium   Date Value Ref Range Status   02/01/2018 10.3 8.6 - 10.3 mg/dL Final     Total Protein   Date Value Ref Range Status   02/01/2018 6.8 6.1 - 8.1 g/dL Final     Albumin   Date Value Ref Range Status   02/01/2018 4.1 3.6 - 5.1 g/dL Final   09/18/2017 4.3 3.1 - 4.7 g/dL      Total Bilirubin   Date Value Ref Range Status   02/01/2018 0.5 0.2 - 1.2 mg/dL Final     Alkaline Phosphatase   Date Value Ref Range Status   02/01/2018 81 40 - 115 U/L Final     AST   Date Value Ref Range Status   02/01/2018 31 10 - 35 U/L Final     ALT   Date Value Ref Range Status   02/01/2018 30 9 - 46 U/L Final     CPK   Date Value Ref Range Status   04/13/2017 82 18 - 170 IU/L      CRP   Date Value Ref Range Status   02/01/2018 3.9 <8.0 mg/L Final      Rheumatoid Factor   Date Value Ref Range Status   04/13/2017 8.8 0.0 - 13.9 IU/mL      Comment:     Performed at: [facility], [facility] [address], [place], [place], [id number] Camarillo MD, Phone:  [phone]     Uric Acid   Date Value Ref Range Status   06/29/2017 5.4 2.6 - 7.8 mg/dL          Radiology/Diagnostic Studies:    none    Assessment/Discussion/Plan:   82 y.o. male with rheumatoid arthritis, seronegative, excellent response to methotrexate 12.5 mg weekly plus hydroxychloroquine 400 mg daily.  2) gout-controlled with allopurinol 300 mg daily  3) CKD-  Stable and managed by his primary care doctor. The patient was reminded again to avoid any aspirin products other than the cardiac aspirin he takes once daily.      PLAN:  I will continue his medication without change. Routine blood testing was performed.      RTC:  I will see him back in 4 months.      Electronically signed by Talib Urbano MD

## 2018-06-03 DIAGNOSIS — M19.90 CHRONIC INFLAMMATORY ARTHRITIS: ICD-10-CM

## 2018-06-03 RX ORDER — FOLIC ACID 1 MG/1
TABLET ORAL
Qty: 90 TABLET | Refills: 3 | Status: SHIPPED | OUTPATIENT
Start: 2018-06-03 | End: 2022-06-23

## 2018-07-28 RX ORDER — HYDROXYCHLOROQUINE SULFATE 200 MG/1
TABLET, FILM COATED ORAL
Qty: 180 TABLET | Refills: 3 | Status: SHIPPED | OUTPATIENT
Start: 2018-07-28 | End: 2019-06-20

## 2019-01-23 RX ORDER — PREDNISONE 2.5 MG/1
TABLET ORAL
Qty: 180 TABLET | Refills: 1 | OUTPATIENT
Start: 2019-01-23

## 2019-01-23 RX ORDER — CELECOXIB 200 MG/1
CAPSULE ORAL
Qty: 90 CAPSULE | Refills: 1 | OUTPATIENT
Start: 2019-01-23

## 2019-05-29 DIAGNOSIS — M19.90 CHRONIC INFLAMMATORY ARTHRITIS: ICD-10-CM

## 2019-05-29 RX ORDER — FOLIC ACID 1 MG/1
TABLET ORAL
Qty: 90 TABLET | Refills: 3 | OUTPATIENT
Start: 2019-05-29

## 2019-06-20 ENCOUNTER — TELEPHONE (OUTPATIENT)
Dept: RHEUMATOLOGY | Facility: CLINIC | Age: 84
End: 2019-06-20

## 2019-06-20 ENCOUNTER — OFFICE VISIT (OUTPATIENT)
Dept: RHEUMATOLOGY | Facility: CLINIC | Age: 84
End: 2019-06-20
Payer: MEDICARE

## 2019-06-20 VITALS — WEIGHT: 210.81 LBS | BODY MASS INDEX: 29.4 KG/M2 | SYSTOLIC BLOOD PRESSURE: 111 MMHG | DIASTOLIC BLOOD PRESSURE: 71 MMHG

## 2019-06-20 DIAGNOSIS — M05.79 RHEUMATOID ARTHRITIS INVOLVING MULTIPLE SITES WITH POSITIVE RHEUMATOID FACTOR: ICD-10-CM

## 2019-06-20 DIAGNOSIS — M10.9 GOUT, UNSPECIFIED CAUSE, UNSPECIFIED CHRONICITY, UNSPECIFIED SITE: Primary | ICD-10-CM

## 2019-06-20 DIAGNOSIS — Z79.899 LONG-TERM USE OF HIGH-RISK MEDICATION: ICD-10-CM

## 2019-06-20 DIAGNOSIS — Z91.199 NONCOMPLIANCE: ICD-10-CM

## 2019-06-20 PROCEDURE — 99214 OFFICE O/P EST MOD 30 MIN: CPT | Mod: ,,, | Performed by: INTERNAL MEDICINE

## 2019-06-20 PROCEDURE — 99214 PR OFFICE/OUTPT VISIT, EST, LEVL IV, 30-39 MIN: ICD-10-PCS | Mod: ,,, | Performed by: INTERNAL MEDICINE

## 2019-06-20 RX ORDER — ERGOCALCIFEROL 1.25 MG/1
CAPSULE ORAL
COMMUNITY
Start: 2019-05-06 | End: 2022-06-23

## 2019-06-20 RX ORDER — HYDROXYCHLOROQUINE SULFATE 200 MG/1
200 TABLET, FILM COATED ORAL DAILY
Qty: 90 TABLET | Refills: 0 | Status: SHIPPED | OUTPATIENT
Start: 2019-06-20 | End: 2019-09-21 | Stop reason: SDUPTHER

## 2019-06-20 RX ORDER — ALLOPURINOL 300 MG/1
300 TABLET ORAL DAILY
Qty: 90 TABLET | Refills: 0 | Status: SHIPPED | OUTPATIENT
Start: 2019-06-20 | End: 2019-09-22 | Stop reason: SDUPTHER

## 2019-06-20 RX ORDER — METFORMIN HYDROCHLORIDE 500 MG/1
500 TABLET ORAL
COMMUNITY
Start: 2019-04-01

## 2019-06-20 RX ORDER — CELECOXIB 200 MG/1
200 CAPSULE ORAL 2 TIMES DAILY
COMMUNITY
End: 2019-12-09

## 2019-06-20 RX ORDER — ROSUVASTATIN CALCIUM 20 MG/1
20 TABLET, COATED ORAL DAILY
COMMUNITY
Start: 2019-05-25

## 2019-06-20 NOTE — PROGRESS NOTES
[facility] RHEUMATOLOGY           Follow-up visit    Notes dictated via Dragon to EPIC. Please forgive any unintended errors.  Subjective:       Patient ID:   NAME: oLgan Galeano : 1935     83 y.o. male    Referring Doc: No ref. provider found  Other Physicians:    Chief Complaint:  Rheumatoid Arthritis (Needs refills on Plaquenil and Folic Acid)      HPI/Interval History:   The patient is doing quite well. He has had no complaints of any red, hot, and/or swollen joints. He has not experienced any gout attacks. He has been off methotrexate for a year and takes hydroxychloroquine only once daily.          ROS:   GEN:    no fever, night sweats or weight loss  SKIN:   no rashes , erythema, bruising, or swelling, no Raynauds, no photosensitivity  HEENT: no changes in vision, no mouth ulcers, no sicca symptoms, no scalp tenderness, no jaw claudication.  CV:      no CP, PND, YAN or orthopnea, no palpitations  PULM: no SOB, cough, hemoptysis, sputum or pleuritic pain  GI:       no GERD, no dysphagia, no abdominal pain, nausea, vomiting, constipation, diarrhea, melanotic stools, BRBPR, or hematemesis  :      no hematuria, dysuria  NEURO: no paresthesias, headaches, acute visual disturbances  MUSCULOSKELETAL:  No muscle or joint complaints  PSYCH:   No insomnia, no increased anxiety or depression    Past Medical/Surgical History:  Past Medical History:   Diagnosis Date    Acid reflux     CAD (coronary artery disease)     Diabetes mellitus     Gout     Hypercholesteremia     Hypertension     Thyroid disease      Past Surgical History:   Procedure Laterality Date    CARPAL TUNNEL RELEASE Right     CORONARY ANGIOPLASTY WITH STENT PLACEMENT      CORONARY ARTERY BYPASS GRAFT      TONSILLECTOMY, ADENOIDECTOMY         Allergies:  Review of patient's allergies indicates:  No Known Allergies    Social/Family History:  Social History     Socioeconomic History    Marital status:      Spouse name: Not on file     Number of children: Not on file    Years of education: Not on file    Highest education level: Not on file   Occupational History    Not on file   Social Needs    Financial resource strain: Not on file    Food insecurity:     Worry: Not on file     Inability: Not on file    Transportation needs:     Medical: Not on file     Non-medical: Not on file   Tobacco Use    Smoking status: Former Smoker    Smokeless tobacco: Never Used   Substance and Sexual Activity    Alcohol use: Yes     Comment: once a week    Drug use: Not on file    Sexual activity: Not on file   Lifestyle    Physical activity:     Days per week: Not on file     Minutes per session: Not on file    Stress: Not on file   Relationships    Social connections:     Talks on phone: Not on file     Gets together: Not on file     Attends [employer] service: Not on file     Active member of club or organization: Not on file     Attends meetings of clubs or organizations: Not on file     Relationship status: Not on file   Other Topics Concern    Not on file   Social History Narrative    Not on file     Family History   Problem Relation Age of Onset    Heart disease Mother     Cancer Mother     Arthritis Mother     Cancer Father     Heart disease Father      FAMILY HISTORY: negative for Connective Tissue Disease      Medications:    Current Outpatient Medications:     allopurinol (ZYLOPRIM) 300 MG tablet, Take 1 tablet (300 mg total) by mouth once daily., Disp: 90 tablet, Rfl: 0    aspirin (ECOTRIN) 325 MG EC tablet, Take 325 mg by mouth once daily., Disp: , Rfl:     celecoxib (CELEBREX) 200 MG capsule, Take 200 mg by mouth 2 (two) times daily. , Disp: , Rfl:     ergocalciferol (ERGOCALCIFEROL) 50,000 unit Cap, , Disp: , Rfl:     folic acid (FOLVITE) 1 MG tablet, TAKE 1 TABLET DAILY, Disp: 90 tablet, Rfl: 3    hydroxychloroquine (PLAQUENIL) 200 mg tablet, Take 1 tablet (200 mg total) by mouth once daily., Disp: 90 tablet, Rfl: 0     levothyroxine (SYNTHROID) 125 MCG tablet, Take 125 mcg by mouth once daily., Disp: , Rfl:     metFORMIN (GLUCOPHAGE) 500 MG tablet, , Disp: , Rfl:     prasugrel (EFFIENT) 10 mg Tab, Take 10 mg by mouth once daily., Disp: , Rfl:     ranitidine (ZANTAC) 300 MG capsule, , Disp: , Rfl:     rosuvastatin (CRESTOR) 20 MG tablet, , Disp: , Rfl:     telmisartan (MICARDIS) 80 MG Tab, Take 80 mg by mouth once daily., Disp: , Rfl:     thyrotropin pacheco 1.1 mg SolR, Inject 0.9 mg into the muscle., Disp: , Rfl:         Objective:     Vitals:  Blood pressure 111/71, weight 95.6 kg (210 lb 12.8 oz).    Physical Examination:   GEN: wn/wd male in no apparent distress  SKIN: no rashes, no sclerodactyly, no Raynaud's, no periungual erythema, no digital tip ulcerations, no nailbed pitting  HEAD: no alopecia, no scalp tenderness, no temporal artery tenderness or induration.  EYES: no pallor, no icterus, PERRLA  ENT:  no thrush, no mucosal dryness or ulcerations, adequate oral hygiene & dentition.  NECK: supple x 6, no masses, no thyromegaly, no lymphadenopathy.  CV:   S1 and S2 regular, no murmurs, gallop or rubs  CHEST: Normal respiratory effort;  normal breath sounds/no adventitious sounds. No signs of consolidation.  ABD: non-tender and non-distended; soft; normal bowel sounds; no rebound/guarding or tenderness. No hepatosplenomegaly.  Musculoskeletal:  No evidence of inflammatory arthritis or any peripheral tophi  EXTREM: no clubbing, cyanosis or edema. normal pulses.  NEURO:  grossly intact; motor/sensory WNL; no tremors  PSYCH:  normal mood, affect and behavior            Labs:   Lab Results   Component Value Date    WBC 7.1 05/28/2018    HGB 15.0 05/28/2018    HCT 46.2 05/28/2018    MCV 99.6 05/28/2018     (L) 05/28/2018   CMP@  Sodium   Date Value Ref Range Status   05/28/2018 140 134 - 144 mmol/L      Potassium   Date Value Ref Range Status   05/28/2018 4.4 3.5 - 5.0 mmol/L      Chloride   Date Value Ref Range Status    05/28/2018 110 98 - 110 mmol/L      CO2   Date Value Ref Range Status   05/28/2018 23.9 22.8 - 31.6 mmol/L      Glucose   Date Value Ref Range Status   05/28/2018 147 (H) 70 - 99 mg/dL      BUN, Bld   Date Value Ref Range Status   05/28/2018 33 (H) 8 - 20 mg/dL      Creatinine   Date Value Ref Range Status   05/28/2018 1.43 (H) 0.60 - 1.40 mg/dL      Calcium   Date Value Ref Range Status   05/28/2018 9.9 7.7 - 10.4 mg/dL      Total Protein   Date Value Ref Range Status   05/28/2018 6.6 6.0 - 8.2 g/dL      Albumin   Date Value Ref Range Status   05/28/2018 4.0 3.1 - 4.7 g/dL      Total Bilirubin   Date Value Ref Range Status   05/28/2018 0.7 0.3 - 1.0 mg/dL      Alkaline Phosphatase   Date Value Ref Range Status   05/28/2018 82 40 - 104 IU/L      AST   Date Value Ref Range Status   05/28/2018 29 10 - 40 IU/L      ALT   Date Value Ref Range Status   02/01/2018 30 9 - 46 U/L Final     CPK   Date Value Ref Range Status   04/13/2017 82 18 - 170 IU/L      CRP   Date Value Ref Range Status   05/28/2018 <0.02 0.00 - 1.40 mg/dL      Rheumatoid Factor   Date Value Ref Range Status   04/13/2017 8.8 0.0 - 13.9 IU/mL      Comment:     Performed at: [facility], [facility] [address], [id number] Camarillo MD, Phone:  [phone]     Uric Acid   Date Value Ref Range Status   05/28/2018 3.8 2.6 - 7.8 mg/dL          Radiology/Diagnostic Studies:    None    Assessment/Discussion/Plan:   83 y.o. male with chronic inflammatory arthritis-controlled on hydroxychloroquine 200 mg daily  2) -year-old-quiescent on allopurinol 300 mg daily      PLAN:  I will continue his medication without change.  I do not have the blood testing that I ordered from the last visit. The patient states he had blood testing done recently by his primary provider.  I have asked him to have it sent to me asa. I will order anything that is needed via [email or web address].  I have renewed his prescriptions for allopurinol and hydroxychloroquine for  90 days only without refills, pending receipt of blood tests.      RTC:  I will see him back in 6 months      Electronically signed by Talib Urbano MD

## 2019-06-20 NOTE — TELEPHONE ENCOUNTER
----- Message from Talib Urbano MD sent at 6/20/2019  2:55 PM CDT -----  Tell him I reviewed the labs. We need the CRP and the uric acid done.  Thanks  ----- Message -----  From: Flex Alexandra MA  Sent: 6/20/2019   2:34 PM  To: Talib Urbano MD        ----- Message -----  From: Belem Cintron  Sent: 6/20/2019   2:34 PM  To: Yoile Mayers Staff    Lab 6/19/19

## 2019-06-26 LAB
CRP SERPL-MCNC: 0.9 MG/L
URATE SERPL-MCNC: 5.6 MG/DL (ref 4–8)

## 2019-09-21 DIAGNOSIS — M05.79 RHEUMATOID ARTHRITIS INVOLVING MULTIPLE SITES WITH POSITIVE RHEUMATOID FACTOR: ICD-10-CM

## 2019-09-21 RX ORDER — HYDROXYCHLOROQUINE SULFATE 200 MG/1
TABLET, FILM COATED ORAL
Qty: 90 TABLET | Refills: 1 | Status: SHIPPED | OUTPATIENT
Start: 2019-09-21 | End: 2020-03-16

## 2019-09-22 DIAGNOSIS — M10.9 GOUT, UNSPECIFIED CAUSE, UNSPECIFIED CHRONICITY, UNSPECIFIED SITE: ICD-10-CM

## 2019-09-22 RX ORDER — ALLOPURINOL 300 MG/1
TABLET ORAL
Qty: 90 TABLET | Refills: 1 | Status: SHIPPED | OUTPATIENT
Start: 2019-09-22 | End: 2020-03-16

## 2019-12-09 ENCOUNTER — OFFICE VISIT (OUTPATIENT)
Dept: RHEUMATOLOGY | Facility: CLINIC | Age: 84
End: 2019-12-09
Payer: MEDICARE

## 2019-12-09 VITALS
WEIGHT: 214.81 LBS | SYSTOLIC BLOOD PRESSURE: 108 MMHG | BODY MASS INDEX: 29.96 KG/M2 | DIASTOLIC BLOOD PRESSURE: 71 MMHG

## 2019-12-09 DIAGNOSIS — M10.9 GOUT, UNSPECIFIED CAUSE, UNSPECIFIED CHRONICITY, UNSPECIFIED SITE: ICD-10-CM

## 2019-12-09 DIAGNOSIS — M05.79 RHEUMATOID ARTHRITIS INVOLVING MULTIPLE SITES WITH POSITIVE RHEUMATOID FACTOR: ICD-10-CM

## 2019-12-09 DIAGNOSIS — Z79.899 ENCOUNTER FOR LONG-TERM (CURRENT) DRUG USE: Primary | ICD-10-CM

## 2019-12-09 PROCEDURE — 99213 PR OFFICE/OUTPT VISIT, EST, LEVL III, 20-29 MIN: ICD-10-PCS | Mod: S$GLB,,, | Performed by: INTERNAL MEDICINE

## 2019-12-09 PROCEDURE — 1125F AMNT PAIN NOTED PAIN PRSNT: CPT | Mod: S$GLB,,, | Performed by: INTERNAL MEDICINE

## 2019-12-09 PROCEDURE — 1125F PR PAIN SEVERITY QUANTIFIED, PAIN PRESENT: ICD-10-PCS | Mod: S$GLB,,, | Performed by: INTERNAL MEDICINE

## 2019-12-09 PROCEDURE — 1159F MED LIST DOCD IN RCRD: CPT | Mod: S$GLB,,, | Performed by: INTERNAL MEDICINE

## 2019-12-09 PROCEDURE — 99213 OFFICE O/P EST LOW 20 MIN: CPT | Mod: S$GLB,,, | Performed by: INTERNAL MEDICINE

## 2019-12-09 PROCEDURE — 1159F PR MEDICATION LIST DOCUMENTED IN MEDICAL RECORD: ICD-10-PCS | Mod: S$GLB,,, | Performed by: INTERNAL MEDICINE

## 2019-12-09 RX ORDER — LEVOTHYROXINE SODIUM 112 UG/1
112 TABLET ORAL
COMMUNITY
Start: 2019-12-02

## 2019-12-09 NOTE — PROGRESS NOTES
[facility] RHEUMATOLOGY           Follow-up visit    Notes dictated to M*Modal. Please forgive any unintended errors.  Subjective:       Patient ID:   NAME: Logan Galeano : 1935     84 y.o. male    Referring Doc: No ref. provider found  Other Physicians:    Chief Complaint:  Gout                                 RHEUMATOID ARTHRITIS       HPI/Interval History:  The patient is doing well.  He has no complaints of any red, hot, and/or swollen joints.  He has had no morning stiffness.  He reports no episodes of gout    ROS:   GEN:    no fever, night sweats or weight loss  SKIN:   no rashes, erythema, bruising, or swelling, no Raynauds, no photosensitivity  HEENT: no changes in vision, no mouth ulcers, no sicca symptoms, no scalp tenderness, no jaw claudication.  CV:      no CP, PND, YAN, orthopnea, no palpitations  PULM: no SOB, cough, hemoptysis, sputum or pleuritic pain  GI:       no GERD, no dysphagia, no hematemesis, no abdominal pain, nausea, vomiting, constipation, diarrhea, melanotic stools, BRBPR  :      no hematuria, dysuria  NEURO: no paresthesias, headaches, acute visual disturbances  MUSCULOSKELETAL:  No muscle or joint complaints  PSYCH:   No increased insomnia, no increased anxiety, no increased depression    Past Medical/Surgical History:  Past Medical History:   Diagnosis Date    Acid reflux     CAD (coronary artery disease)     Diabetes mellitus     Gout     Hypercholesteremia     Hypertension     Thyroid disease      Past Surgical History:   Procedure Laterality Date    CARPAL TUNNEL RELEASE Right     CORONARY ANGIOPLASTY WITH STENT PLACEMENT      CORONARY ARTERY BYPASS GRAFT      TONSILLECTOMY, ADENOIDECTOMY         Allergies:  Review of patient's allergies indicates:  No Known Allergies    Social/Family History:  Social History     Socioeconomic History    Marital status:      Spouse name: Not on file    Number of children: Not on file    Years of education: Not on file     Highest education level: Not on file   Occupational History    Not on file   Social Needs    Financial resource strain: Not on file    Food insecurity:     Worry: Not on file     Inability: Not on file    Transportation needs:     Medical: Not on file     Non-medical: Not on file   Tobacco Use    Smoking status: Former Smoker    Smokeless tobacco: Never Used   Substance and Sexual Activity    Alcohol use: Yes     Comment: once a week    Drug use: Not on file    Sexual activity: Not on file   Lifestyle    Physical activity:     Days per week: Not on file     Minutes per session: Not on file    Stress: Not on file   Relationships    Social connections:     Talks on phone: Not on file     Gets together: Not on file     Attends Synagogue service: Not on file     Active member of club or organization: Not on file     Attends meetings of clubs or organizations: Not on file     Relationship status: Not on file   Other Topics Concern    Not on file   Social History Narrative    Not on file     Family History   Problem Relation Age of Onset    Heart disease Mother     Cancer Mother     Arthritis Mother     Cancer Father     Heart disease Father      FAMILY HISTORY: negative for Connective Tissue Disease      Medications:    Current Outpatient Medications:     allopurinol (ZYLOPRIM) 300 MG tablet, TAKE 1 TABLET DAILY, Disp: 90 tablet, Rfl: 1    aspirin (ECOTRIN) 325 MG EC tablet, Take 325 mg by mouth once daily., Disp: , Rfl:     ergocalciferol (ERGOCALCIFEROL) 50,000 unit Cap, , Disp: , Rfl:     folic acid (FOLVITE) 1 MG tablet, TAKE 1 TABLET DAILY, Disp: 90 tablet, Rfl: 3    hydroxychloroquine (PLAQUENIL) 200 mg tablet, TAKE 1 TABLET DAILY, Disp: 90 tablet, Rfl: 1    LEVOXYL 112 mcg tablet, , Disp: , Rfl:     metFORMIN (GLUCOPHAGE) 500 MG tablet, , Disp: , Rfl:     prasugrel (EFFIENT) 10 mg Tab, Take 10 mg by mouth once daily., Disp: , Rfl:     rosuvastatin (CRESTOR) 20 MG tablet, , Disp: ,  Rfl:     telmisartan (MICARDIS) 80 MG Tab, Take 80 mg by mouth once daily., Disp: , Rfl:     thyrotropin pacheco 1.1 mg SolR, Inject 0.9 mg into the muscle., Disp: , Rfl:     ranitidine (ZANTAC) 300 MG capsule, , Disp: , Rfl:       Objective:     Vitals:  Blood pressure 108/71, weight 97.4 kg (214 lb 12.8 oz).    Physical Examination:   GEN: wn/wd male in no apparent distress  SKIN: no rashes, no sclerodactyly, no Raynaud's, no periungual erythema, no digital tip ulcerations, no nailbed pitting  HEAD: no alopecia, no scalp tenderness, no temporal artery tenderness or induration.  EYES: no pallor, no icterus, PERRLA  ENT:  no thrush, no mucosal dryness or ulcerations, adequate oral hygiene & dentition.  NECK: supple x 6, no masses, no thyromegaly, no lymphadenopathy.  CV:   S1 and S2 regular, no murmurs, gallop or rubs  CHEST: Normal respiratory effort;  normal breath sounds/no adventitious sounds. No signs of consolidation.  ABD: non-tender and non-distended; soft; normal bowel sounds; no rebound/guarding or tenderness. No hepatosplenomegaly.  Musculoskeletal:  No evidence of inflammatory arthritis.  No progressive deformity.  No peripheral tophi  EXTREM: no clubbing, cyanosis or edema. normal pulses.  NEURO:  grossly intact; motor/sensory WNL; no tremors  PSYCH:  normal mood, affect and behavior    Labs:   Lab Results   Component Value Date    WBC 7.1 05/28/2018    HGB 15.0 05/28/2018    HCT 46.2 05/28/2018    MCV 99.6 05/28/2018     (L) 05/28/2018   CMP@  Sodium   Date Value Ref Range Status   05/28/2018 140 134 - 144 mmol/L      Potassium   Date Value Ref Range Status   05/28/2018 4.4 3.5 - 5.0 mmol/L      Chloride   Date Value Ref Range Status   05/28/2018 110 98 - 110 mmol/L      CO2   Date Value Ref Range Status   05/28/2018 23.9 22.8 - 31.6 mmol/L      Glucose   Date Value Ref Range Status   05/28/2018 147 (H) 70 - 99 mg/dL      BUN, Bld   Date Value Ref Range Status   05/28/2018 33 (H) 8 - 20 mg/dL       Creatinine   Date Value Ref Range Status   05/28/2018 1.43 (H) 0.60 - 1.40 mg/dL      Calcium   Date Value Ref Range Status   05/28/2018 9.9 7.7 - 10.4 mg/dL      Total Protein   Date Value Ref Range Status   05/28/2018 6.6 6.0 - 8.2 g/dL      Albumin   Date Value Ref Range Status   05/28/2018 4.0 3.1 - 4.7 g/dL      Total Bilirubin   Date Value Ref Range Status   05/28/2018 0.7 0.3 - 1.0 mg/dL      Alkaline Phosphatase   Date Value Ref Range Status   05/28/2018 82 40 - 104 IU/L      AST   Date Value Ref Range Status   05/28/2018 29 10 - 40 IU/L      ALT   Date Value Ref Range Status   02/01/2018 30 9 - 46 U/L Final     CPK   Date Value Ref Range Status   04/13/2017 82 18 - 170 IU/L      CRP   Date Value Ref Range Status   06/25/2019 0.9 <8.0 mg/L Final     Rheumatoid Factor   Date Value Ref Range Status   04/13/2017 8.8 0.0 - 13.9 IU/mL      Comment:     Performed at: [facility], [facility] [address], [id number] Ragland, [place], Phone:  [phone]     Uric Acid   Date Value Ref Range Status   06/25/2019 5.6 4.0 - 8.0 mg/dL Final     Comment:     Therapeutic target for gout patients: <6.0 mg/dL             Radiology/Diagnostic Studies:    None    Assessment/Discussion/Plan:   84 y.o. male with chronic inflammatory arthritis-good control on hydroxychloroquine 200 mg daily  2) gouty arthritis-quiescent on allopurinol 300 mg daily  3) CKD, off NSAIDs/[place] 2    PLAN:  I will continue his medication without change.  Routine blood testing was ordered.  This may be performed prior to his next visit.    RTC:  I will see him back in 6 months    Electronically signed by Talib Urbano MD

## 2020-03-16 DIAGNOSIS — M10.9 GOUT, UNSPECIFIED CAUSE, UNSPECIFIED CHRONICITY, UNSPECIFIED SITE: ICD-10-CM

## 2020-03-16 DIAGNOSIS — M05.79 RHEUMATOID ARTHRITIS INVOLVING MULTIPLE SITES WITH POSITIVE RHEUMATOID FACTOR: ICD-10-CM

## 2020-03-16 RX ORDER — ALLOPURINOL 300 MG/1
TABLET ORAL
Qty: 90 TABLET | Refills: 3 | Status: SHIPPED | OUTPATIENT
Start: 2020-03-16 | End: 2020-09-12

## 2020-03-16 RX ORDER — HYDROXYCHLOROQUINE SULFATE 200 MG/1
TABLET, FILM COATED ORAL
Qty: 90 TABLET | Refills: 3 | Status: SHIPPED | OUTPATIENT
Start: 2020-03-16 | End: 2020-06-15

## 2020-06-15 ENCOUNTER — OFFICE VISIT (OUTPATIENT)
Dept: RHEUMATOLOGY | Facility: CLINIC | Age: 85
End: 2020-06-15
Payer: MEDICARE

## 2020-06-15 VITALS
TEMPERATURE: 98 F | SYSTOLIC BLOOD PRESSURE: 114 MMHG | WEIGHT: 213 LBS | DIASTOLIC BLOOD PRESSURE: 76 MMHG | BODY MASS INDEX: 29.71 KG/M2

## 2020-06-15 DIAGNOSIS — M19.90 CHRONIC INFLAMMATORY ARTHRITIS: ICD-10-CM

## 2020-06-15 DIAGNOSIS — M10.9 GOUT, UNSPECIFIED CAUSE, UNSPECIFIED CHRONICITY, UNSPECIFIED SITE: Primary | ICD-10-CM

## 2020-06-15 DIAGNOSIS — Z79.899 ENCOUNTER FOR LONG-TERM (CURRENT) DRUG USE: ICD-10-CM

## 2020-06-15 PROCEDURE — 99214 OFFICE O/P EST MOD 30 MIN: CPT | Mod: S$GLB,,, | Performed by: INTERNAL MEDICINE

## 2020-06-15 PROCEDURE — 99214 PR OFFICE/OUTPT VISIT, EST, LEVL IV, 30-39 MIN: ICD-10-PCS | Mod: S$GLB,,, | Performed by: INTERNAL MEDICINE

## 2020-06-15 RX ORDER — FAMOTIDINE 40 MG/1
40 TABLET, FILM COATED ORAL NIGHTLY
COMMUNITY
Start: 2020-05-30

## 2020-06-15 RX ORDER — METOPROLOL TARTRATE 25 MG/1
50 TABLET, FILM COATED ORAL 2 TIMES DAILY
COMMUNITY
Start: 2020-05-15

## 2020-06-15 NOTE — PROGRESS NOTES
[facility] RHEUMATOLOGY           Follow-up visit    Notes dictated to M*Modal. Please forgive any unintended errors.  Subjective:       Patient ID:   NAME: Logan Galeano : 1935     84 y.o. male    Referring Doc: No ref. provider found  Other Physicians:    Chief Complaint:  Gout and Rheumatoid Arthritis      HPI/Interval History:  The patient is doing well.  He has had no issues with red, hot, and/or swollen joints.  He has had no episodes of gout.    ROS:   GEN:    no fever, night sweats or weight loss  SKIN:   no rashes, erythema, bruising, or swelling, no Raynauds, no photosensitivity  HEENT: no changes in vision, no mouth ulcers, no sicca symptoms, no scalp tenderness, no jaw claudication.  CV:      no CP, PND, YAN, orthopnea, no palpitations  PULM: no SOB, cough, hemoptysis, sputum or pleuritic pain  GI:       no GERD, no dysphagia, no hematemesis, no abdominal pain, nausea, vomiting, constipation, diarrhea, melanotic stools, BRBPR  :      no hematuria, dysuria  NEURO: no paresthesias, headaches, acute visual disturbances  MUSCULOSKELETAL:  As above   PSYCH:   No increased insomnia, no increased anxiety, no increased depression    Past Medical/Surgical History:  Past Medical History:   Diagnosis Date    Acid reflux     CAD (coronary artery disease)     Diabetes mellitus     Gout     Hypercholesteremia     Hypertension     Thyroid disease      Past Surgical History:   Procedure Laterality Date    CARPAL TUNNEL RELEASE Right     CORONARY ANGIOPLASTY WITH STENT PLACEMENT      CORONARY ARTERY BYPASS GRAFT      TONSILLECTOMY, ADENOIDECTOMY         Allergies:  Review of patient's allergies indicates:  No Known Allergies    Social/Family History:  Social History     Socioeconomic History    Marital status:      Spouse name: Not on file    Number of children: Not on file    Years of education: Not on file    Highest education level: Not on file   Occupational History    Not on file    Social Needs    Financial resource strain: Not on file    Food insecurity     Worry: Not on file     Inability: Not on file    Transportation needs     Medical: Not on file     Non-medical: Not on file   Tobacco Use    Smoking status: Former Smoker    Smokeless tobacco: Never Used   Substance and Sexual Activity    Alcohol use: Yes     Comment: once a week    Drug use: Not on file    Sexual activity: Not on file   Lifestyle    Physical activity     Days per week: Not on file     Minutes per session: Not on file    Stress: Not on file   Relationships    Social connections     Talks on phone: Not on file     Gets together: Not on file     Attends Hinduism service: Not on file     Active member of club or organization: Not on file     Attends meetings of clubs or organizations: Not on file     Relationship status: Not on file   Other Topics Concern    Not on file   Social History Narrative    Not on file     Family History   Problem Relation Age of Onset    Heart disease Mother     Cancer Mother     Arthritis Mother     Cancer Father     Heart disease Father      FAMILY HISTORY: negative for Connective Tissue Disease      Medications:    Current Outpatient Medications:     allopurinoL (ZYLOPRIM) 300 MG tablet, TAKE 1 TABLET DAILY, Disp: 90 tablet, Rfl: 3    aspirin (ECOTRIN) 325 MG EC tablet, Take 325 mg by mouth once daily., Disp: , Rfl:     ergocalciferol (ERGOCALCIFEROL) 50,000 unit Cap, , Disp: , Rfl:     famotidine (PEPCID) 40 MG tablet, , Disp: , Rfl:     LEVOXYL 112 mcg tablet, , Disp: , Rfl:     metFORMIN (GLUCOPHAGE) 500 MG tablet, , Disp: , Rfl:     metoprolol tartrate (LOPRESSOR) 25 MG tablet, , Disp: , Rfl:     prasugrel (EFFIENT) 10 mg Tab, Take 10 mg by mouth once daily., Disp: , Rfl:     rosuvastatin (CRESTOR) 20 MG tablet, , Disp: , Rfl:     telmisartan (MICARDIS) 80 MG Tab, Take 80 mg by mouth once daily., Disp: , Rfl:     folic acid (FOLVITE) 1 MG tablet, TAKE 1 TABLET  DAILY (Patient not taking: Reported on 6/15/2020), Disp: 90 tablet, Rfl: 3    thyrotropin pacheco 1.1 mg SolR, Inject 0.9 mg into the muscle., Disp: , Rfl:       Objective:     Vitals:  Blood pressure 114/76, temperature 98.2 °F (36.8 °C), weight 96.6 kg (213 lb).    Physical Examination:   GEN: wn/wd male in no apparent distress  SKIN: no rashes, no sclerodactyly, no Raynaud's, no periungual erythema, no digital tip ulcerations, no nailbed pitting  HEAD: no alopecia, no scalp tenderness, no temporal artery tenderness or induration.  EYES: no pallor, no icterus, PERRLA  ENT:  no thrush, no mucosal dryness or ulcerations, adequate oral hygiene & dentition.  NECK: supple x 6, no masses, no thyromegaly, no lymphadenopathy.  CV:   S1 and S2 regular, no murmurs, gallop or rubs  CHEST: Normal respiratory effort;  normal breath sounds/no adventitious sounds. No signs of consolidation.  ABD: non-tender and non-distended; soft; normal bowel sounds; no rebound/guarding or tenderness. No hepatosplenomegaly.  Musculoskeletal:  No evidence of active synovitis.  No peripheral deformity.  No tophi  EXTREM: no clubbing, cyanosis or edema. normal pulses.  NEURO:  grossly intact; motor/sensory WNL; no tremors  PSYCH:  normal mood, affect and behavior    Labs:   Lab Results   Component Value Date    WBC 7.1 05/28/2018    HGB 15.0 05/28/2018    HCT 46.2 05/28/2018    MCV 99.6 05/28/2018     (L) 05/28/2018   CMP@  Sodium   Date Value Ref Range Status   05/28/2018 140 134 - 144 mmol/L      Potassium   Date Value Ref Range Status   05/28/2018 4.4 3.5 - 5.0 mmol/L      Chloride   Date Value Ref Range Status   05/28/2018 110 98 - 110 mmol/L      CO2   Date Value Ref Range Status   05/28/2018 23.9 22.8 - 31.6 mmol/L      Glucose   Date Value Ref Range Status   05/28/2018 147 (H) 70 - 99 mg/dL      BUN, Bld   Date Value Ref Range Status   05/28/2018 33 (H) 8 - 20 mg/dL      Creatinine   Date Value Ref Range Status   05/28/2018 1.43 (H)  0.60 - 1.40 mg/dL      Calcium   Date Value Ref Range Status   05/28/2018 9.9 7.7 - 10.4 mg/dL      Total Protein   Date Value Ref Range Status   05/28/2018 6.6 6.0 - 8.2 g/dL      Albumin   Date Value Ref Range Status   05/28/2018 4.0 3.1 - 4.7 g/dL      Total Bilirubin   Date Value Ref Range Status   05/28/2018 0.7 0.3 - 1.0 mg/dL      Alkaline Phosphatase   Date Value Ref Range Status   05/28/2018 82 40 - 104 IU/L      AST   Date Value Ref Range Status   05/28/2018 29 10 - 40 IU/L      ALT   Date Value Ref Range Status   02/01/2018 30 9 - 46 U/L Final     CPK   Date Value Ref Range Status   04/13/2017 82 18 - 170 IU/L      CRP   Date Value Ref Range Status   06/25/2019 0.9 <8.0 mg/L Final     Rheumatoid Factor   Date Value Ref Range Status   04/13/2017 8.8 0.0 - 13.9 IU/mL      Comment:     Performed at: [facility], [facility] [address], [id number] Camarillo MD, Phone:  [phone]     Uric Acid   Date Value Ref Range Status   06/25/2019 5.6 4.0 - 8.0 mg/dL Final     Comment:     Therapeutic target for gout patients: <6.0 mg/dL             Radiology/Diagnostic Studies:    None    Assessment/Discussion/Plan:   84 y.o. male with chronic inflammatory arthritis-good control with Plaquenil 200 mg daily  2) gout-quiescent on allopurinol 300 mg daily    PLAN:  The patient has had some tachy arrhythmias in the last 2 months.  He has seen his cardiologist and had a complete workup, including a Holter monitor which was turned in last week but not yet interpreted for him.  He has read about purported cardiac anomalies with hydroxychloroquine in the COVID-19 sphere and has concern that it is the cause of his problem.  I explained that this is extremely doubtful and that what data was published has recently been retracted by both the NEJM and Lancet.  None the less, he has concerns and as a result, I have recommended that he stop the medication.  He would at least like to try to get along  without it.  I have no objection.    I have continued his allopurinol.  I would like to decrease the dose of that but I do not have a serum uric acid level in 1 year.  I will consider decreasing the does after the blood testing ordered today is complete.    RTC:  I will see him back in 6 months or sooner if needed    Electronically signed by Talib Urbano MD

## 2020-06-26 LAB
ALBUMIN SERPL-MCNC: 4.5 G/DL (ref 3.6–5.1)
ALBUMIN/GLOB SERPL: 2 (CALC) (ref 1–2.5)
ALP SERPL-CCNC: 72 U/L (ref 35–144)
ALT SERPL-CCNC: 19 U/L (ref 9–46)
AST SERPL-CCNC: 25 U/L (ref 10–35)
BASOPHILS # BLD AUTO: 61 CELLS/UL (ref 0–200)
BASOPHILS NFR BLD AUTO: 0.8 %
BILIRUB SERPL-MCNC: 0.6 MG/DL (ref 0.2–1.2)
BUN SERPL-MCNC: 28 MG/DL (ref 7–25)
BUN/CREAT SERPL: 15 (CALC) (ref 6–22)
CALCIUM SERPL-MCNC: 10.7 MG/DL (ref 8.6–10.3)
CHLORIDE SERPL-SCNC: 106 MMOL/L (ref 98–110)
CO2 SERPL-SCNC: 24 MMOL/L (ref 20–32)
CREAT SERPL-MCNC: 1.84 MG/DL (ref 0.7–1.11)
CRP SERPL-MCNC: 1.1 MG/L
EOSINOPHIL # BLD AUTO: 327 CELLS/UL (ref 15–500)
EOSINOPHIL NFR BLD AUTO: 4.3 %
ERYTHROCYTE [DISTWIDTH] IN BLOOD BY AUTOMATED COUNT: 12.9 % (ref 11–15)
GFRSERPLBLD MDRD-ARVRAT: 33 ML/MIN/1.73M2
GLOBULIN SER CALC-MCNC: 2.2 G/DL (CALC) (ref 1.9–3.7)
GLUCOSE SERPL-MCNC: 108 MG/DL (ref 65–99)
HCT VFR BLD AUTO: 46.2 % (ref 38.5–50)
HGB BLD-MCNC: 15.6 G/DL (ref 13.2–17.1)
LYMPHOCYTES # BLD AUTO: 1429 CELLS/UL (ref 850–3900)
LYMPHOCYTES NFR BLD AUTO: 18.8 %
MCH RBC QN AUTO: 32.5 PG (ref 27–33)
MCHC RBC AUTO-ENTMCNC: 33.8 G/DL (ref 32–36)
MCV RBC AUTO: 96.3 FL (ref 80–100)
MONOCYTES # BLD AUTO: 775 CELLS/UL (ref 200–950)
MONOCYTES NFR BLD AUTO: 10.2 %
NEUTROPHILS # BLD AUTO: 5008 CELLS/UL (ref 1500–7800)
NEUTROPHILS NFR BLD AUTO: 65.9 %
PLATELET # BLD AUTO: 153 THOUSAND/UL (ref 140–400)
PMV BLD REES-ECKER: 10.7 FL (ref 7.5–12.5)
POTASSIUM SERPL-SCNC: 4.8 MMOL/L (ref 3.5–5.3)
PROT SERPL-MCNC: 6.7 G/DL (ref 6.1–8.1)
RBC # BLD AUTO: 4.8 MILLION/UL (ref 4.2–5.8)
SODIUM SERPL-SCNC: 140 MMOL/L (ref 135–146)
URATE SERPL-MCNC: 5 MG/DL (ref 4–8)
WBC # BLD AUTO: 7.6 THOUSAND/UL (ref 3.8–10.8)

## 2020-12-15 ENCOUNTER — OFFICE VISIT (OUTPATIENT)
Dept: RHEUMATOLOGY | Facility: CLINIC | Age: 85
End: 2020-12-15
Payer: MEDICARE

## 2020-12-15 VITALS
WEIGHT: 212.19 LBS | SYSTOLIC BLOOD PRESSURE: 109 MMHG | TEMPERATURE: 97 F | DIASTOLIC BLOOD PRESSURE: 68 MMHG | BODY MASS INDEX: 29.6 KG/M2

## 2020-12-15 DIAGNOSIS — N18.30 STAGE 3 CHRONIC KIDNEY DISEASE, UNSPECIFIED WHETHER STAGE 3A OR 3B CKD: ICD-10-CM

## 2020-12-15 DIAGNOSIS — M19.90 CHRONIC INFLAMMATORY ARTHRITIS: Primary | ICD-10-CM

## 2020-12-15 DIAGNOSIS — M10.9 GOUT, UNSPECIFIED CAUSE, UNSPECIFIED CHRONICITY, UNSPECIFIED SITE: ICD-10-CM

## 2020-12-15 DIAGNOSIS — Z79.899 ENCOUNTER FOR LONG-TERM (CURRENT) DRUG USE: ICD-10-CM

## 2020-12-15 PROCEDURE — 99214 OFFICE O/P EST MOD 30 MIN: CPT | Mod: S$GLB,,, | Performed by: INTERNAL MEDICINE

## 2020-12-15 PROCEDURE — 99214 PR OFFICE/OUTPT VISIT, EST, LEVL IV, 30-39 MIN: ICD-10-PCS | Mod: S$GLB,,, | Performed by: INTERNAL MEDICINE

## 2020-12-15 RX ORDER — HYDROXYCHLOROQUINE SULFATE 200 MG/1
TABLET, FILM COATED ORAL DAILY
COMMUNITY
End: 2021-06-22 | Stop reason: SDUPTHER

## 2020-12-15 RX ORDER — ALLOPURINOL 300 MG/1
TABLET ORAL
COMMUNITY
Start: 2020-12-11 | End: 2020-12-15 | Stop reason: SDUPTHER

## 2020-12-15 RX ORDER — PANTOPRAZOLE SODIUM 40 MG/1
TABLET, DELAYED RELEASE ORAL
COMMUNITY
Start: 2020-11-27 | End: 2022-06-23

## 2020-12-15 RX ORDER — ALLOPURINOL 100 MG/1
200 TABLET ORAL DAILY
Qty: 180 TABLET | Refills: 1 | Status: SHIPPED | OUTPATIENT
Start: 2020-12-15 | End: 2021-05-03

## 2020-12-15 NOTE — PROGRESS NOTES
[facility] RHEUMATOLOGY           Follow-up visit    Notes dictated to M*Modal. Please forgive any unintended errors.  Subjective:       Patient ID:   NAME: Logan Galeano : 1935     85 y.o. male    Referring Doc: No ref. provider found  Other Physicians:    Chief Complaint:  Gout      HPI/Interval History:  The patient is doing well.  He has not had any episodes of gout.  He resumed his Plaquenil and has not had any red, hot, and/or swollen joints.    ROS:   GEN:    no fever, night sweats or weight loss  SKIN:   no rashes, bruising, or swelling, no Raynauds, no photosensitivity  HEENT: no changes in vision, no mouth ulcers, no sicca symptoms, no scalp tenderness, no jaw claudication.  CV:      no CP, PND, YAN, orthopnea, no palpitations  PULM: no SOB, cough, hemoptysis, sputum or pleuritic pain  GI:        no dysphagia, no GERD, no hematemesis, no abdominal pain, nausea, vomiting, constipation, diarrhea, melanotic stools, BRBPR  :      no hematuria, dysuria  NEURO: no paresthesias, headaches, acute visual disturbances  MUSCULOSKELETAL:  As above  PSYCH:   No increased insomnia, no increased anxiety, no increased depression    Past Medical/Surgical History:  Past Medical History:   Diagnosis Date    Acid reflux     CAD (coronary artery disease)     Diabetes mellitus     Gout     Hypercholesteremia     Hypertension     Thyroid disease      Past Surgical History:   Procedure Laterality Date    CARPAL TUNNEL RELEASE Right     CORONARY ANGIOPLASTY WITH STENT PLACEMENT      CORONARY ARTERY BYPASS GRAFT      TONSILLECTOMY, ADENOIDECTOMY         Allergies:  Review of patient's allergies indicates:  No Known Allergies    Social/Family History:  Social History     Socioeconomic History    Marital status:      Spouse name: Not on file    Number of children: Not on file    Years of education: Not on file    Highest education level: Not on file   Occupational History    Not on file   Social Needs     Financial resource strain: Not on file    Food insecurity     Worry: Not on file     Inability: Not on file    Transportation needs     Medical: Not on file     Non-medical: Not on file   Tobacco Use    Smoking status: Former Smoker    Smokeless tobacco: Never Used   Substance and Sexual Activity    Alcohol use: Yes     Comment: once a week    Drug use: Not on file    Sexual activity: Not on file   Lifestyle    Physical activity     Days per week: Not on file     Minutes per session: Not on file    Stress: Not on file   Relationships    Social connections     Talks on phone: Not on file     Gets together: Not on file     Attends Jainism service: Not on file     Active member of club or organization: Not on file     Attends meetings of clubs or organizations: Not on file     Relationship status: Not on file   Other Topics Concern    Not on file   Social History Narrative    Not on file     Family History   Problem Relation Age of Onset    Heart disease Mother     Cancer Mother     Arthritis Mother     Cancer Father     Heart disease Father      FAMILY HISTORY: negative for Connective Tissue Disease      Medications:    Current Outpatient Medications:     allopurinoL (ZYLOPRIM) 100 MG tablet, Take 2 tablets (200 mg total) by mouth once daily., Disp: 180 tablet, Rfl: 1    aspirin (ECOTRIN) 325 MG EC tablet, Take 325 mg by mouth once daily., Disp: , Rfl:     ergocalciferol (ERGOCALCIFEROL) 50,000 unit Cap, , Disp: , Rfl:     famotidine (PEPCID) 40 MG tablet, , Disp: , Rfl:     hydrOXYchloroQUINE (PLAQUENIL) 200 mg tablet, Take by mouth once daily., Disp: , Rfl:     metFORMIN (GLUCOPHAGE) 500 MG tablet, , Disp: , Rfl:     metoprolol tartrate (LOPRESSOR) 25 MG tablet, , Disp: , Rfl:     pantoprazole (PROTONIX) 40 MG tablet, , Disp: , Rfl:     prasugrel (EFFIENT) 10 mg Tab, Take 10 mg by mouth once daily., Disp: , Rfl:     rosuvastatin (CRESTOR) 20 MG tablet, , Disp: , Rfl:     telmisartan  (MICARDIS) 80 MG Tab, Take 80 mg by mouth once daily., Disp: , Rfl:     thyrotropin pacheco 1.1 mg SolR, Inject 0.9 mg into the muscle., Disp: , Rfl:     folic acid (FOLVITE) 1 MG tablet, TAKE 1 TABLET DAILY (Patient not taking: Reported on 6/15/2020), Disp: 90 tablet, Rfl: 3    LEVOXYL 112 mcg tablet, , Disp: , Rfl:       Objective:     Vitals:  Blood pressure 109/68, temperature 97 °F (36.1 °C), weight 96.3 kg (212 lb 3.2 oz).    Physical Examination:   GEN: wn/wd male in no apparent distress  SKIN: no rashes, no sclerodactyly, no Raynaud's, no periungual erythema, no digital tip ulcerations, no nailbed pitting  HEAD: no alopecia, no scalp tenderness, no temporal artery tenderness or induration.  EYES: no pallor, no icterus, PERRLA  ENT:  no thrush, no mucosal dryness or ulcerations, adequate oral hygiene & dentition.  NECK: supple x 6, no masses, no thyromegaly, no lymphadenopathy.  CV:   S1 and S2 regular, no murmurs, gallop or rubs  CHEST: Normal respiratory effort;  normal breath sounds/no adventitious sounds. No signs of consolidation.  ABD: non-tender and non-distended; soft; normal bowel sounds; no rebound/guarding or tenderness. No hepatosplenomegaly.  Musculoskeletal:  No evidence of inflammatory disease.  No progressive deformity.  No peripheral tophi  EXTREM: no clubbing, cyanosis or edema. normal pulses.  NEURO:  grossly intact; motor/sensory WNL; no tremors  PSYCH:  normal mood, affect and behavior    Labs:   Lab Results   Component Value Date    WBC 7.6 06/25/2020    HGB 15.6 06/25/2020    HCT 46.2 06/25/2020    MCV 96.3 06/25/2020     06/25/2020   CMP@  Sodium   Date Value Ref Range Status   06/25/2020 140 135 - 146 mmol/L Final   05/28/2018 140 134 - 144 mmol/L      Potassium   Date Value Ref Range Status   06/25/2020 4.8 3.5 - 5.3 mmol/L Final     Chloride   Date Value Ref Range Status   06/25/2020 106 98 - 110 mmol/L Final   05/28/2018 110 98 - 110 mmol/L      CO2   Date Value Ref Range  Status   06/25/2020 24 20 - 32 mmol/L Final     Glucose   Date Value Ref Range Status   06/25/2020 108 (H) 65 - 99 mg/dL Final     Comment:                   Fasting reference interval     For someone without known diabetes, a glucose value  between 100 and 125 mg/dL is consistent with  prediabetes and should be confirmed with a  follow-up test.        05/28/2018 147 (H) 70 - 99 mg/dL      BUN   Date Value Ref Range Status   06/25/2020 28 (H) 7 - 25 mg/dL Final     Creatinine   Date Value Ref Range Status   06/25/2020 1.84 (H) 0.70 - 1.11 mg/dL Final     Comment:     For patients >49 years of age, the reference limit  for Creatinine is approximately 13% higher for people  identified as -American.        05/28/2018 1.43 (H) 0.60 - 1.40 mg/dL      Calcium   Date Value Ref Range Status   06/25/2020 10.7 (H) 8.6 - 10.3 mg/dL Final     Total Protein   Date Value Ref Range Status   06/25/2020 6.7 6.1 - 8.1 g/dL Final     Albumin   Date Value Ref Range Status   06/25/2020 4.5 3.6 - 5.1 g/dL Final   05/28/2018 4.0 3.1 - 4.7 g/dL      Total Bilirubin   Date Value Ref Range Status   06/25/2020 0.6 0.2 - 1.2 mg/dL Final     Alkaline Phosphatase   Date Value Ref Range Status   06/25/2020 72 35 - 144 U/L Final     AST   Date Value Ref Range Status   06/25/2020 25 10 - 35 U/L Final     ALT   Date Value Ref Range Status   06/25/2020 19 9 - 46 U/L Final     CPK   Date Value Ref Range Status   04/13/2017 82 18 - 170 IU/L      CRP   Date Value Ref Range Status   06/25/2020 1.1 <8.0 mg/L Final     Rheumatoid Factor   Date Value Ref Range Status   04/13/2017 8.8 0.0 - 13.9 IU/mL      Comment:     Performed at: [facility], [facility] [id number] [place], [place], [id number] Camarillo MD, Phone:  [phone]     Uric Acid   Date Value Ref Range Status   06/25/2020 5.0 4.0 - 8.0 mg/dL Final     Comment:     Therapeutic target for gout patients: <6.0 mg/dL             Radiology/Diagnostic  Studies:    None    Assessment/Discussion/Plan:   85 y.o. male with chronic gouty arthritis-stable on allopurinol 300 mg daily  2) low-grade rheumatoid arthritis-stable on hydroxychloroquine 200 mg daily  3) CKD stage III    PLAN:  I have decreased his allopurinol from 300 mg daily to 200 mg daily.  I have explained to him that in part this is to accommodate his declining renal function.  Follow-up blood testing was ordered and will be done along with those orders of his primary care provider.    RTC:  I will see him back in 6 months    Electronically signed by Talib Urbano MD

## 2021-06-01 DIAGNOSIS — M10.9 GOUT, UNSPECIFIED CAUSE, UNSPECIFIED CHRONICITY, UNSPECIFIED SITE: ICD-10-CM

## 2021-06-01 DIAGNOSIS — M05.79 RHEUMATOID ARTHRITIS INVOLVING MULTIPLE SITES WITH POSITIVE RHEUMATOID FACTOR: Primary | ICD-10-CM

## 2021-06-01 DIAGNOSIS — Z79.899 ENCOUNTER FOR LONG-TERM (CURRENT) USE OF MEDICATIONS: ICD-10-CM

## 2021-06-11 LAB
ALBUMIN SERPL-MCNC: 4.1 G/DL (ref 3.6–5.1)
ALBUMIN/GLOB SERPL: 1.7 (CALC) (ref 1–2.5)
ALP SERPL-CCNC: 61 U/L (ref 35–144)
ALT SERPL-CCNC: 15 U/L (ref 9–46)
AST SERPL-CCNC: 20 U/L (ref 10–35)
BASOPHILS # BLD AUTO: 67 CELLS/UL (ref 0–200)
BASOPHILS NFR BLD AUTO: 1 %
BILIRUB SERPL-MCNC: 0.6 MG/DL (ref 0.2–1.2)
BUN SERPL-MCNC: 33 MG/DL (ref 7–25)
BUN/CREAT SERPL: 15 (CALC) (ref 6–22)
CALCIUM SERPL-MCNC: 10.7 MG/DL (ref 8.6–10.3)
CHLORIDE SERPL-SCNC: 106 MMOL/L (ref 98–110)
CO2 SERPL-SCNC: 27 MMOL/L (ref 20–32)
CREAT SERPL-MCNC: 2.14 MG/DL (ref 0.7–1.11)
CRP SERPL-MCNC: 0.6 MG/L
EOSINOPHIL # BLD AUTO: 369 CELLS/UL (ref 15–500)
EOSINOPHIL NFR BLD AUTO: 5.5 %
ERYTHROCYTE [DISTWIDTH] IN BLOOD BY AUTOMATED COUNT: 13.2 % (ref 11–15)
GLOBULIN SER CALC-MCNC: 2.4 G/DL (CALC) (ref 1.9–3.7)
GLUCOSE SERPL-MCNC: 105 MG/DL (ref 65–99)
HCT VFR BLD AUTO: 41.7 % (ref 38.5–50)
HGB BLD-MCNC: 14 G/DL (ref 13.2–17.1)
LYMPHOCYTES # BLD AUTO: 1461 CELLS/UL (ref 850–3900)
LYMPHOCYTES NFR BLD AUTO: 21.8 %
MCH RBC QN AUTO: 32 PG (ref 27–33)
MCHC RBC AUTO-ENTMCNC: 33.6 G/DL (ref 32–36)
MCV RBC AUTO: 95.2 FL (ref 80–100)
MONOCYTES # BLD AUTO: 683 CELLS/UL (ref 200–950)
MONOCYTES NFR BLD AUTO: 10.2 %
NEUTROPHILS # BLD AUTO: 4121 CELLS/UL (ref 1500–7800)
NEUTROPHILS NFR BLD AUTO: 61.5 %
PLATELET # BLD AUTO: NORMAL THOUSAND/UL
PLATELET BLD QL SMEAR: NORMAL
POTASSIUM SERPL-SCNC: 5.1 MMOL/L (ref 3.5–5.3)
PROT SERPL-MCNC: 6.5 G/DL (ref 6.1–8.1)
RBC # BLD AUTO: 4.38 MILLION/UL (ref 4.2–5.8)
SODIUM SERPL-SCNC: 139 MMOL/L (ref 135–146)
URATE SERPL-MCNC: 5 MG/DL (ref 4–8)
WBC # BLD AUTO: 6.7 THOUSAND/UL (ref 3.8–10.8)

## 2021-06-22 ENCOUNTER — OFFICE VISIT (OUTPATIENT)
Dept: RHEUMATOLOGY | Facility: CLINIC | Age: 86
End: 2021-06-22
Payer: MEDICARE

## 2021-06-22 VITALS
BODY MASS INDEX: 29.76 KG/M2 | SYSTOLIC BLOOD PRESSURE: 106 MMHG | DIASTOLIC BLOOD PRESSURE: 61 MMHG | WEIGHT: 213.38 LBS

## 2021-06-22 DIAGNOSIS — M05.79 RHEUMATOID ARTHRITIS INVOLVING MULTIPLE SITES WITH POSITIVE RHEUMATOID FACTOR: Primary | ICD-10-CM

## 2021-06-22 DIAGNOSIS — M10.9 GOUT, UNSPECIFIED CAUSE, UNSPECIFIED CHRONICITY, UNSPECIFIED SITE: ICD-10-CM

## 2021-06-22 DIAGNOSIS — N18.4 CHRONIC KIDNEY DISEASE (CKD) STAGE G4/A1, SEVERELY DECREASED GLOMERULAR FILTRATION RATE (GFR) BETWEEN 15-29 ML/MIN/1.73 SQUARE METER AND ALBUMINURIA CREATININE RATIO LESS THAN 30 MG/G: ICD-10-CM

## 2021-06-22 DIAGNOSIS — Z79.899 ENCOUNTER FOR LONG-TERM (CURRENT) USE OF MEDICATIONS: ICD-10-CM

## 2021-06-22 PROCEDURE — 99214 PR OFFICE/OUTPT VISIT, EST, LEVL IV, 30-39 MIN: ICD-10-PCS | Mod: S$GLB,,, | Performed by: INTERNAL MEDICINE

## 2021-06-22 PROCEDURE — 99214 OFFICE O/P EST MOD 30 MIN: CPT | Mod: S$GLB,,, | Performed by: INTERNAL MEDICINE

## 2021-06-22 RX ORDER — ALLOPURINOL 100 MG/1
200 TABLET ORAL DAILY
Qty: 180 TABLET | Refills: 1 | Status: SHIPPED | OUTPATIENT
Start: 2021-06-22 | End: 2022-01-05

## 2021-06-22 RX ORDER — TAMSULOSIN HYDROCHLORIDE 0.4 MG/1
0.4 CAPSULE ORAL NIGHTLY
COMMUNITY
Start: 2021-05-01

## 2021-06-22 RX ORDER — HYDROXYCHLOROQUINE SULFATE 200 MG/1
200 TABLET, FILM COATED ORAL DAILY
Qty: 90 TABLET | Refills: 1 | Status: SHIPPED | OUTPATIENT
Start: 2021-06-22 | End: 2021-12-23 | Stop reason: SDUPTHER

## 2021-12-23 ENCOUNTER — OFFICE VISIT (OUTPATIENT)
Dept: RHEUMATOLOGY | Facility: CLINIC | Age: 86
End: 2021-12-23
Payer: MEDICARE

## 2021-12-23 VITALS
WEIGHT: 207.38 LBS | BODY MASS INDEX: 28.93 KG/M2 | DIASTOLIC BLOOD PRESSURE: 75 MMHG | SYSTOLIC BLOOD PRESSURE: 109 MMHG

## 2021-12-23 DIAGNOSIS — M05.79 RHEUMATOID ARTHRITIS INVOLVING MULTIPLE SITES WITH POSITIVE RHEUMATOID FACTOR: Primary | ICD-10-CM

## 2021-12-23 DIAGNOSIS — M10.9 GOUT, UNSPECIFIED CAUSE, UNSPECIFIED CHRONICITY, UNSPECIFIED SITE: ICD-10-CM

## 2021-12-23 DIAGNOSIS — Z79.899 ENCOUNTER FOR LONG-TERM (CURRENT) USE OF MEDICATIONS: ICD-10-CM

## 2021-12-23 DIAGNOSIS — N18.4 CHRONIC KIDNEY DISEASE (CKD) STAGE G4/A1, SEVERELY DECREASED GLOMERULAR FILTRATION RATE (GFR) BETWEEN 15-29 ML/MIN/1.73 SQUARE METER AND ALBUMINURIA CREATININE RATIO LESS THAN 30 MG/G: ICD-10-CM

## 2021-12-23 PROCEDURE — 99213 OFFICE O/P EST LOW 20 MIN: CPT | Mod: S$GLB,,, | Performed by: INTERNAL MEDICINE

## 2021-12-23 PROCEDURE — 99213 PR OFFICE/OUTPT VISIT, EST, LEVL III, 20-29 MIN: ICD-10-PCS | Mod: S$GLB,,, | Performed by: INTERNAL MEDICINE

## 2021-12-23 RX ORDER — HYDROXYCHLOROQUINE SULFATE 200 MG/1
200 TABLET, FILM COATED ORAL DAILY
Qty: 90 TABLET | Refills: 1 | Status: SHIPPED | OUTPATIENT
Start: 2021-12-23 | End: 2022-06-23

## 2022-06-23 ENCOUNTER — LAB VISIT (OUTPATIENT)
Dept: LAB | Facility: HOSPITAL | Age: 87
End: 2022-06-23
Attending: INTERNAL MEDICINE
Payer: MEDICARE

## 2022-06-23 ENCOUNTER — OFFICE VISIT (OUTPATIENT)
Dept: RHEUMATOLOGY | Facility: CLINIC | Age: 87
End: 2022-06-23
Payer: MEDICARE

## 2022-06-23 VITALS — BODY MASS INDEX: 29.6 KG/M2 | WEIGHT: 212.19 LBS | DIASTOLIC BLOOD PRESSURE: 87 MMHG | SYSTOLIC BLOOD PRESSURE: 138 MMHG

## 2022-06-23 DIAGNOSIS — Z79.899 ENCOUNTER FOR LONG-TERM (CURRENT) USE OF MEDICATIONS: ICD-10-CM

## 2022-06-23 DIAGNOSIS — M05.79 RHEUMATOID ARTHRITIS INVOLVING MULTIPLE SITES WITH POSITIVE RHEUMATOID FACTOR: Primary | ICD-10-CM

## 2022-06-23 DIAGNOSIS — M05.79 RHEUMATOID ARTHRITIS INVOLVING MULTIPLE SITES WITH POSITIVE RHEUMATOID FACTOR: ICD-10-CM

## 2022-06-23 DIAGNOSIS — M10.9 GOUT, UNSPECIFIED CAUSE, UNSPECIFIED CHRONICITY, UNSPECIFIED SITE: ICD-10-CM

## 2022-06-23 LAB
ALBUMIN SERPL BCP-MCNC: 4.1 G/DL (ref 3.5–5.2)
ALP SERPL-CCNC: 59 U/L (ref 55–135)
ALT SERPL W/O P-5'-P-CCNC: 29 U/L (ref 10–44)
ANION GAP SERPL CALC-SCNC: 6 MMOL/L (ref 8–16)
AST SERPL-CCNC: 32 U/L (ref 10–40)
BASOPHILS # BLD AUTO: 0.06 K/UL (ref 0–0.2)
BASOPHILS NFR BLD: 0.8 % (ref 0–1.9)
BILIRUB SERPL-MCNC: 0.6 MG/DL (ref 0.1–1)
BUN SERPL-MCNC: 32 MG/DL (ref 8–23)
CALCIUM SERPL-MCNC: 10 MG/DL (ref 8.7–10.5)
CHLORIDE SERPL-SCNC: 103 MMOL/L (ref 95–110)
CO2 SERPL-SCNC: 27 MMOL/L (ref 23–29)
CREAT SERPL-MCNC: 2 MG/DL (ref 0.5–1.4)
CRP SERPL-MCNC: 0.09 MG/DL
DIFFERENTIAL METHOD: ABNORMAL
EOSINOPHIL # BLD AUTO: 0.3 K/UL (ref 0–0.5)
EOSINOPHIL NFR BLD: 4.5 % (ref 0–8)
ERYTHROCYTE [DISTWIDTH] IN BLOOD BY AUTOMATED COUNT: 14.6 % (ref 11.5–14.5)
EST. GFR  (AFRICAN AMERICAN): 34.4 ML/MIN/1.73 M^2
EST. GFR  (NON AFRICAN AMERICAN): 29.8 ML/MIN/1.73 M^2
GLUCOSE SERPL-MCNC: 126 MG/DL (ref 70–110)
HCT VFR BLD AUTO: 46.2 % (ref 40–54)
HGB BLD-MCNC: 15 G/DL (ref 14–18)
IMM GRANULOCYTES # BLD AUTO: 0.03 K/UL (ref 0–0.04)
IMM GRANULOCYTES NFR BLD AUTO: 0.4 % (ref 0–0.5)
LYMPHOCYTES # BLD AUTO: 1.3 K/UL (ref 1–4.8)
LYMPHOCYTES NFR BLD: 16.5 % (ref 18–48)
MCH RBC QN AUTO: 31 PG (ref 27–31)
MCHC RBC AUTO-ENTMCNC: 32.5 G/DL (ref 32–36)
MCV RBC AUTO: 96 FL (ref 82–98)
MONOCYTES # BLD AUTO: 1 K/UL (ref 0.3–1)
MONOCYTES NFR BLD: 12.9 % (ref 4–15)
NEUTROPHILS # BLD AUTO: 4.9 K/UL (ref 1.8–7.7)
NEUTROPHILS NFR BLD: 64.9 % (ref 38–73)
NRBC BLD-RTO: 0 /100 WBC
PLATELET # BLD AUTO: 127 K/UL (ref 150–450)
PMV BLD AUTO: 10.3 FL (ref 9.2–12.9)
POTASSIUM SERPL-SCNC: 4.3 MMOL/L (ref 3.5–5.1)
PROT SERPL-MCNC: 7 G/DL (ref 6–8.4)
RBC # BLD AUTO: 4.84 M/UL (ref 4.6–6.2)
SODIUM SERPL-SCNC: 136 MMOL/L (ref 136–145)
URATE SERPL-MCNC: 4.1 MG/DL (ref 3.4–7)
WBC # BLD AUTO: 7.57 K/UL (ref 3.9–12.7)

## 2022-06-23 PROCEDURE — 3288F PR FALLS RISK ASSESSMENT DOCUMENTED: ICD-10-PCS | Mod: CPTII,S$GLB,, | Performed by: INTERNAL MEDICINE

## 2022-06-23 PROCEDURE — 80053 COMPREHEN METABOLIC PANEL: CPT | Performed by: INTERNAL MEDICINE

## 2022-06-23 PROCEDURE — 86140 C-REACTIVE PROTEIN: CPT | Performed by: INTERNAL MEDICINE

## 2022-06-23 PROCEDURE — 1125F PR PAIN SEVERITY QUANTIFIED, PAIN PRESENT: ICD-10-PCS | Mod: CPTII,S$GLB,, | Performed by: INTERNAL MEDICINE

## 2022-06-23 PROCEDURE — 3079F PR MOST RECENT DIASTOLIC BLOOD PRESSURE 80-89 MM HG: ICD-10-PCS | Mod: CPTII,S$GLB,, | Performed by: INTERNAL MEDICINE

## 2022-06-23 PROCEDURE — 1159F MED LIST DOCD IN RCRD: CPT | Mod: CPTII,S$GLB,, | Performed by: INTERNAL MEDICINE

## 2022-06-23 PROCEDURE — 1101F PT FALLS ASSESS-DOCD LE1/YR: CPT | Mod: CPTII,S$GLB,, | Performed by: INTERNAL MEDICINE

## 2022-06-23 PROCEDURE — 1101F PR PT FALLS ASSESS DOC 0-1 FALLS W/OUT INJ PAST YR: ICD-10-PCS | Mod: CPTII,S$GLB,, | Performed by: INTERNAL MEDICINE

## 2022-06-23 PROCEDURE — 3288F FALL RISK ASSESSMENT DOCD: CPT | Mod: CPTII,S$GLB,, | Performed by: INTERNAL MEDICINE

## 2022-06-23 PROCEDURE — 1125F AMNT PAIN NOTED PAIN PRSNT: CPT | Mod: CPTII,S$GLB,, | Performed by: INTERNAL MEDICINE

## 2022-06-23 PROCEDURE — 99213 OFFICE O/P EST LOW 20 MIN: CPT | Mod: S$GLB,,, | Performed by: INTERNAL MEDICINE

## 2022-06-23 PROCEDURE — 36415 COLL VENOUS BLD VENIPUNCTURE: CPT | Performed by: INTERNAL MEDICINE

## 2022-06-23 PROCEDURE — 3079F DIAST BP 80-89 MM HG: CPT | Mod: CPTII,S$GLB,, | Performed by: INTERNAL MEDICINE

## 2022-06-23 PROCEDURE — 1160F PR REVIEW ALL MEDS BY PRESCRIBER/CLIN PHARMACIST DOCUMENTED: ICD-10-PCS | Mod: CPTII,S$GLB,, | Performed by: INTERNAL MEDICINE

## 2022-06-23 PROCEDURE — 3075F SYST BP GE 130 - 139MM HG: CPT | Mod: CPTII,S$GLB,, | Performed by: INTERNAL MEDICINE

## 2022-06-23 PROCEDURE — 3075F PR MOST RECENT SYSTOLIC BLOOD PRESS GE 130-139MM HG: ICD-10-PCS | Mod: CPTII,S$GLB,, | Performed by: INTERNAL MEDICINE

## 2022-06-23 PROCEDURE — 1160F RVW MEDS BY RX/DR IN RCRD: CPT | Mod: CPTII,S$GLB,, | Performed by: INTERNAL MEDICINE

## 2022-06-23 PROCEDURE — 84550 ASSAY OF BLOOD/URIC ACID: CPT | Performed by: INTERNAL MEDICINE

## 2022-06-23 PROCEDURE — 99213 PR OFFICE/OUTPT VISIT, EST, LEVL III, 20-29 MIN: ICD-10-PCS | Mod: S$GLB,,, | Performed by: INTERNAL MEDICINE

## 2022-06-23 PROCEDURE — 1159F PR MEDICATION LIST DOCUMENTED IN MEDICAL RECORD: ICD-10-PCS | Mod: CPTII,S$GLB,, | Performed by: INTERNAL MEDICINE

## 2022-06-23 PROCEDURE — 85025 COMPLETE CBC W/AUTO DIFF WBC: CPT | Performed by: INTERNAL MEDICINE

## 2022-06-23 NOTE — PROGRESS NOTES
[facility] RHEUMATOLOGY           Follow-up visit    Notes dictated to M*Modal. Please forgive any unintended errors.  Subjective:       Patient ID:   NAME: Logan Galeano : 1935     86 y.o. male    Referring Doc: No ref. provider found  Other Physicians:    Chief Complaint:  Rheumatoid Arthritis                                  GOUT    HPI/Interval History:  The patient is doing very well.  He has had no problems with arthritis of any variety    ROS:   GEN:    no fever, night sweats or weight loss  SKIN:   no rashes, bruising, or swelling, no Raynauds, no photosensitivity  HEENT: no changes in vision, no mouth ulcers, no sicca symptoms, no scalp tenderness, no jaw claudication.  CV:      no CP, PND, YAN, orthopnea, no palpitations  PULM: no SOB, cough, hemoptysis, sputum or pleuritic pain  GI:        no dysphagia, no GERD, no hematemesis, no abdominal pain, nausea, vomiting, constipation, diarrhea, melanotic stools, hematochezia  :      no hematuria, dysuria  NEURO: no paresthesias, headaches, seizures  MUSCULOSKELETAL:  No red, hot, and/or swollen joints  PSYCH:   No increased insomnia, no increased anxiety, no increased depression    Past Medical/Surgical History:  Past Medical History:   Diagnosis Date    Acid reflux     CAD (coronary artery disease)     Diabetes mellitus     Gout     Hypercholesteremia     Hypertension     Thyroid disease      Past Surgical History:   Procedure Laterality Date    CARPAL TUNNEL RELEASE Right     CORONARY ANGIOPLASTY WITH STENT PLACEMENT      CORONARY ARTERY BYPASS GRAFT      INSERTION OF PACEMAKER  2021    TONSILLECTOMY, ADENOIDECTOMY         Allergies:  Review of patient's allergies indicates:  No Known Allergies    Social/Family History:  Social History     Socioeconomic History    Marital status:    Tobacco Use    Smoking status: Former Smoker    Smokeless tobacco: Never Used   Substance and Sexual Activity    Alcohol use: Yes     Comment:  once a week     Family History   Problem Relation Age of Onset    Heart disease Mother     Cancer Mother     Arthritis Mother     Cancer Father     Heart disease Father      FAMILY HISTORY: negative for Connective Tissue Disease      Medications:    Current Outpatient Medications:     allopurinoL (ZYLOPRIM) 100 MG tablet, TAKE 2 TABLETS ONCE DAILY, Disp: 180 tablet, Rfl: 3    aspirin (ECOTRIN) 325 MG EC tablet, Take 325 mg by mouth once daily., Disp: , Rfl:     ergocalciferol (ERGOCALCIFEROL) 50,000 unit Cap, , Disp: , Rfl:     famotidine (PEPCID) 40 MG tablet, , Disp: , Rfl:     folic acid (FOLVITE) 1 MG tablet, TAKE 1 TABLET DAILY, Disp: 90 tablet, Rfl: 3    LEVOXYL 112 mcg tablet, , Disp: , Rfl:     metFORMIN (GLUCOPHAGE) 500 MG tablet, , Disp: , Rfl:     metoprolol tartrate (LOPRESSOR) 25 MG tablet, , Disp: , Rfl:     pantoprazole (PROTONIX) 40 MG tablet, , Disp: , Rfl:     PLAQUENIL 200 mg tablet, TAKE 1 TABLET DAILY, Disp: 90 tablet, Rfl: 3    prasugrel (EFFIENT) 10 mg Tab, Take 10 mg by mouth once daily., Disp: , Rfl:     rosuvastatin (CRESTOR) 20 MG tablet, , Disp: , Rfl:     tamsulosin (FLOMAX) 0.4 mg Cap, , Disp: , Rfl:     telmisartan (MICARDIS) 80 MG Tab, Take 80 mg by mouth once daily., Disp: , Rfl:     thyrotropin pacheco 1.1 mg SolR, Inject 0.9 mg into the muscle., Disp: , Rfl:       Objective:     Vitals:  Weight 96.3 kg (212 lb 3.2 oz).    Physical Examination:   GEN: wn/wd male in no apparent distress  SKIN: no rashes, no sclerodactyly, no Raynaud's, no periungual erythema, no digital tip ulcerations, no nailbed pitting  HEAD: no alopecia, no scalp tenderness, no temporal artery tenderness or induration.  EYES: no pallor, no icterus, PERRLA  ENT:  no thrush, no mucosal dryness or ulcerations, adequate oral hygiene & dentition.  NECK: supple x 6, no masses, no thyromegaly, no lymphadenopathy.  CV:   S1 and S2 regular, no murmurs, gallop or rubs  CHEST: Normal respiratory effort;   normal breath sounds/no adventitious sounds. No signs of consolidation.  ABD: non-tender and non-distended; soft; normal bowel sounds; no rebound/guarding or tenderness. No hepatosplenomegaly.  Musculoskeletal:  No evidence of active inflammation  EXTREM: no clubbing, cyanosis or edema. normal pulses.  NEURO:  grossly intact; motor/sensory WNL; no tremors  PSYCH:  normal mood, affect and behavior    Labs:   Lab Results   Component Value Date    WBC 6.7 06/10/2021    HGB 14.0 06/10/2021    HCT 41.7 06/10/2021    MCV 95.2 06/10/2021    PLT TNP 06/10/2021   CMP@  Sodium   Date Value Ref Range Status   06/10/2021 139 135 - 146 mmol/L Final   05/28/2018 140 134 - 144 mmol/L      Potassium   Date Value Ref Range Status   06/10/2021 5.1 3.5 - 5.3 mmol/L Final     Chloride   Date Value Ref Range Status   06/10/2021 106 98 - 110 mmol/L Final   05/28/2018 110 98 - 110 mmol/L      CO2   Date Value Ref Range Status   06/10/2021 27 20 - 32 mmol/L Final     Glucose   Date Value Ref Range Status   06/10/2021 105 (H) 65 - 99 mg/dL Final     Comment:                   Fasting reference interval     For someone without known diabetes, a glucose value  between 100 and 125 mg/dL is consistent with  prediabetes and should be confirmed with a  follow-up test.        05/28/2018 147 (H) 70 - 99 mg/dL      BUN   Date Value Ref Range Status   06/10/2021 33 (H) 7 - 25 mg/dL Final     Creatinine   Date Value Ref Range Status   06/10/2021 2.14 (H) 0.70 - 1.11 mg/dL Final     Comment:     For patients >49 years of age, the reference limit  for Creatinine is approximately 13% higher for people  identified as -American.        05/28/2018 1.43 (H) 0.60 - 1.40 mg/dL      Calcium   Date Value Ref Range Status   06/10/2021 10.7 (H) 8.6 - 10.3 mg/dL Final     Total Protein   Date Value Ref Range Status   06/10/2021 6.5 6.1 - 8.1 g/dL Final     Albumin   Date Value Ref Range Status   06/10/2021 4.1 3.6 - 5.1 g/dL Final   05/28/2018 4.0 3.1 - 4.7  g/dL      Total Bilirubin   Date Value Ref Range Status   06/10/2021 0.6 0.2 - 1.2 mg/dL Final     Alkaline Phosphatase   Date Value Ref Range Status   06/25/2020 72 35 - 144 U/L Final     AST   Date Value Ref Range Status   06/10/2021 20 10 - 35 U/L Final     ALT   Date Value Ref Range Status   06/10/2021 15 9 - 46 U/L Final     CPK   Date Value Ref Range Status   04/13/2017 82 18 - 170 IU/L      CRP   Date Value Ref Range Status   06/10/2021 0.6 <8.0 mg/L Final     Rheumatoid Factor   Date Value Ref Range Status   04/13/2017 8.8 0.0 - 13.9 IU/mL      Comment:     Performed at: [facility], [facility] [address], [id number] Camarillo MD, Phone:  [phone]     Uric Acid   Date Value Ref Range Status   06/10/2021 5.0 4.0 - 8.0 mg/dL Final     Comment:     Therapeutic target for gout patients: <6.0 mg/dL             Radiology/Diagnostic Studies:    None    Assessment/Discussion/Plan:   86 y.o. male with chronic inflammatory arthritis-controlled on hydroxychloroquine 300 mg daily  2) gout-controlled on allopurinol 200 mg daily    PLAN:  I will continue his medication without change.  Routine follow-up blood testing was ordered.    RTC:  I will see him back at any time before the end of this year        Electronically signed by Talib Urbano MD      Patient notified that this office will be closing December 22, 2022. They should begin looking for another rheumatologist as soon as possible.  A list with the names and contact details of rheumatologists in the surrounding area was provided.

## 2024-06-20 NOTE — PROGRESS NOTES
Subjective:       Patient ID: Logan Galeano is a 81 y.o. male.    Chief Complaint: Rheumatoid Arthritis  The patient has benefited from the addition of prednisone at 5 mg daily.  HPI  The patient had been seen years ago by me for gout. That has been controlled well with long-term allopurinol. At the last visit here, I saw him for what I felt was probably polymyalgia rheumatica. Night did appropriate blood testing and started him on prednisone at 5 mg daily (a low dose due to his somewhat labile diabetes). He is quite a bit better but at this point notes increasing swelling and stiffness in the knuckles of his right hand.  Review of Systems  The review of systems is positive from a rheumatologic standpoint for joint swelling and stiffness, without much erythema or pain He does not have problems with generalized myalgias or muscle pain at this point.  Objective:      Physical Exam  the physical exam reveals stable vital signs. The skin demonstrates no lesions. The cardiovascular exam is normal.The extremity examination demonstrates 5/5 × 4 strength. There is low-grade synovial proliferation present in the right second and third MCPs only.There is a bit of tenderness in the right third PIP area no other abnormalities are noted including an absence of metatarsalgia.  Assessment:       1. rheumatoid Sub-optimally controlled   2. Chronic inflammatory arthritis    3. Methotrexate, long term, current use    4. Morning stiffness of joints        Plan:       Logan was seen today for rheumatoid arthritis.    Diagnoses and all orders for this visit:    rheumatoid  -     C-reactive protein; Future  -     C-reactive protein  -     hydroxychloroquine (PLAQUENIL) 200 mg tablet; Take 1 tablet (200 mg total) by mouth 2 (two) times daily.    Chronic inflammatory arthritis  -     C-reactive protein; Future  -     C-reactive protein  -     hydroxychloroquine (PLAQUENIL) 200 mg tablet; Take 1 tablet (200 mg total) by mouth 2 (two) times  daily.    Methotrexate, long term, current use  -     Comprehensive metabolic panel; Future  -     CBC w/ Auto Differential; Future  -     Comprehensive metabolic panel  -     CBC w/ Auto Differential    Morning stiffness of joints    Other orders  -     Estimated Glomerular Filtration Rate  -     Manual Differential       Despite the seronegativity of the blood work, this appears more compatible with early rheumatoid arthritis than it does with polymyalgia rheumatica.   Regardless, considering his sub-optimal control of his diabetes, I am inclined to leave his prednisone at 5 mg daily for now and start hydroxychloroquine 200 mg twice daily. Ideally, this is for the treatment of what I suspect is rheumatoid arthritis. Regardless, even if he does have PMR, hydroxychloroquine may be a good and efficacious substitute for steroids.  I have ordered appropriate follow-up blood testing.   I will see him back in 2 months and then again 2 months later and at that time I will try to wean him off steroids quickly.  The patient understands the plan and the non routine approach that I am taking and is in full agreement.   No